# Patient Record
Sex: MALE | Race: WHITE | NOT HISPANIC OR LATINO | Employment: OTHER | URBAN - METROPOLITAN AREA
[De-identification: names, ages, dates, MRNs, and addresses within clinical notes are randomized per-mention and may not be internally consistent; named-entity substitution may affect disease eponyms.]

---

## 2024-01-01 ENCOUNTER — PATIENT OUTREACH (OUTPATIENT)
Dept: ONCOLOGY | Facility: CLINIC | Age: 69
End: 2024-01-01
Payer: COMMERCIAL

## 2024-01-01 ENCOUNTER — PATIENT OUTREACH (OUTPATIENT)
Dept: CARE COORDINATION | Facility: CLINIC | Age: 69
End: 2024-01-01
Payer: COMMERCIAL

## 2024-01-01 ENCOUNTER — DOCUMENTATION ONLY (OUTPATIENT)
Dept: OTHER | Facility: CLINIC | Age: 69
End: 2024-01-01
Payer: COMMERCIAL

## 2024-01-01 ENCOUNTER — HOSPITAL ENCOUNTER (INPATIENT)
Facility: HOSPITAL | Age: 69
LOS: 4 days | Discharge: HOME HEALTH | DRG: 271 | End: 2024-04-09
Attending: EMERGENCY MEDICINE | Admitting: INTERNAL MEDICINE
Payer: COMMERCIAL

## 2024-01-01 ENCOUNTER — APPOINTMENT (OUTPATIENT)
Dept: OCCUPATIONAL THERAPY | Facility: HOSPITAL | Age: 69
DRG: 271 | End: 2024-01-01
Attending: INTERNAL MEDICINE
Payer: COMMERCIAL

## 2024-01-01 ENCOUNTER — LAB (OUTPATIENT)
Dept: INFUSION THERAPY | Facility: HOSPITAL | Age: 69
End: 2024-01-01
Attending: INTERNAL MEDICINE
Payer: COMMERCIAL

## 2024-01-01 ENCOUNTER — HOSPITAL ENCOUNTER (OUTPATIENT)
Dept: MRI IMAGING | Facility: HOSPITAL | Age: 69
Discharge: HOME | End: 2024-03-26
Attending: PHYSICIAN ASSISTANT
Payer: COMMERCIAL

## 2024-01-01 ENCOUNTER — HOME INFUSION (PRE-WILLOW HOME INFUSION) (OUTPATIENT)
Dept: PHARMACY | Facility: CLINIC | Age: 69
End: 2024-01-01
Payer: COMMERCIAL

## 2024-01-01 ENCOUNTER — PATIENT OUTREACH (OUTPATIENT)
Dept: GASTROENTEROLOGY | Facility: CLINIC | Age: 69
End: 2024-01-01
Payer: COMMERCIAL

## 2024-01-01 ENCOUNTER — TELEPHONE (OUTPATIENT)
Dept: INTERVENTIONAL RADIOLOGY/VASCULAR | Facility: CLINIC | Age: 69
End: 2024-01-01
Payer: COMMERCIAL

## 2024-01-01 ENCOUNTER — APPOINTMENT (OUTPATIENT)
Dept: OCCUPATIONAL THERAPY | Facility: HOSPITAL | Age: 69
DRG: 271 | End: 2024-01-01
Payer: COMMERCIAL

## 2024-01-01 ENCOUNTER — ONCOLOGY VISIT (OUTPATIENT)
Dept: ONCOLOGY | Facility: CLINIC | Age: 69
End: 2024-01-01
Attending: INTERNAL MEDICINE
Payer: COMMERCIAL

## 2024-01-01 ENCOUNTER — HOSPITAL ENCOUNTER (OUTPATIENT)
Dept: GENERAL RADIOLOGY | Facility: HOSPITAL | Age: 69
Discharge: HOME | End: 2024-04-11
Attending: PHYSICIAN ASSISTANT | Admitting: PHYSICIAN ASSISTANT
Payer: COMMERCIAL

## 2024-01-01 ENCOUNTER — ANESTHESIA EVENT (OUTPATIENT)
Dept: SURGERY | Facility: CLINIC | Age: 69
End: 2024-01-01
Payer: COMMERCIAL

## 2024-01-01 ENCOUNTER — LAB REQUISITION (OUTPATIENT)
Dept: LAB | Facility: CLINIC | Age: 69
End: 2024-01-01
Payer: COMMERCIAL

## 2024-01-01 ENCOUNTER — HOSPITAL ENCOUNTER (OUTPATIENT)
Facility: CLINIC | Age: 69
End: 2024-01-01
Attending: INTERNAL MEDICINE | Admitting: INTERNAL MEDICINE
Payer: COMMERCIAL

## 2024-01-01 ENCOUNTER — ANESTHESIA (OUTPATIENT)
Dept: SURGERY | Facility: CLINIC | Age: 69
End: 2024-01-01
Payer: COMMERCIAL

## 2024-01-01 ENCOUNTER — APPOINTMENT (OUTPATIENT)
Dept: ULTRASOUND IMAGING | Facility: HOSPITAL | Age: 69
DRG: 271 | End: 2024-01-01
Attending: EMERGENCY MEDICINE
Payer: COMMERCIAL

## 2024-01-01 ENCOUNTER — VIRTUAL VISIT (OUTPATIENT)
Dept: ONCOLOGY | Facility: CLINIC | Age: 69
End: 2024-01-01
Payer: COMMERCIAL

## 2024-01-01 ENCOUNTER — TELEPHONE (OUTPATIENT)
Dept: GASTROENTEROLOGY | Facility: CLINIC | Age: 69
End: 2024-01-01
Payer: COMMERCIAL

## 2024-01-01 ENCOUNTER — TRANSCRIBE ORDERS (OUTPATIENT)
Dept: OTHER | Age: 69
End: 2024-01-01

## 2024-01-01 ENCOUNTER — NURSE TRIAGE (OUTPATIENT)
Dept: ONCOLOGY | Facility: CLINIC | Age: 69
End: 2024-01-01

## 2024-01-01 ENCOUNTER — APPOINTMENT (OUTPATIENT)
Dept: CT IMAGING | Facility: HOSPITAL | Age: 69
DRG: 271 | End: 2024-01-01
Attending: INTERNAL MEDICINE
Payer: COMMERCIAL

## 2024-01-01 ENCOUNTER — NURSE TRIAGE (OUTPATIENT)
Dept: ONCOLOGY | Facility: CLINIC | Age: 69
End: 2024-01-01
Payer: COMMERCIAL

## 2024-01-01 ENCOUNTER — VIRTUAL VISIT (OUTPATIENT)
Dept: PALLIATIVE CARE | Facility: CLINIC | Age: 69
End: 2024-01-01
Attending: INTERNAL MEDICINE
Payer: COMMERCIAL

## 2024-01-01 ENCOUNTER — APPOINTMENT (OUTPATIENT)
Dept: INTERVENTIONAL RADIOLOGY/VASCULAR | Facility: HOSPITAL | Age: 69
DRG: 271 | End: 2024-01-01
Attending: RADIOLOGY
Payer: COMMERCIAL

## 2024-01-01 ENCOUNTER — HOSPITAL ENCOUNTER (OUTPATIENT)
Dept: ULTRASOUND IMAGING | Facility: HOSPITAL | Age: 69
Discharge: HOME | End: 2024-03-28
Attending: PHYSICIAN ASSISTANT | Admitting: PHYSICIAN ASSISTANT
Payer: COMMERCIAL

## 2024-01-01 ENCOUNTER — APPOINTMENT (OUTPATIENT)
Dept: LAB | Facility: CLINIC | Age: 69
End: 2024-01-01
Attending: INTERNAL MEDICINE
Payer: COMMERCIAL

## 2024-01-01 ENCOUNTER — HOSPITAL ENCOUNTER (OUTPATIENT)
Dept: CT IMAGING | Facility: HOSPITAL | Age: 69
Discharge: HOME | End: 2024-03-23
Attending: INTERNAL MEDICINE | Admitting: INTERNAL MEDICINE
Payer: COMMERCIAL

## 2024-01-01 ENCOUNTER — APPOINTMENT (OUTPATIENT)
Dept: RADIOLOGY | Facility: HOSPITAL | Age: 69
DRG: 271 | End: 2024-01-01
Attending: EMERGENCY MEDICINE
Payer: COMMERCIAL

## 2024-01-01 ENCOUNTER — HOSPITAL ENCOUNTER (OUTPATIENT)
Dept: INTERVENTIONAL RADIOLOGY/VASCULAR | Facility: HOSPITAL | Age: 69
Discharge: HOME | End: 2024-03-13
Attending: INTERNAL MEDICINE | Admitting: RADIOLOGY
Payer: COMMERCIAL

## 2024-01-01 ENCOUNTER — APPOINTMENT (OUTPATIENT)
Dept: PHYSICAL THERAPY | Facility: HOSPITAL | Age: 69
DRG: 271 | End: 2024-01-01
Attending: INTERNAL MEDICINE
Payer: COMMERCIAL

## 2024-01-01 ENCOUNTER — PRE VISIT (OUTPATIENT)
Dept: ONCOLOGY | Facility: CLINIC | Age: 69
End: 2024-01-01
Payer: COMMERCIAL

## 2024-01-01 ENCOUNTER — HOSPITAL ENCOUNTER (OUTPATIENT)
Facility: CLINIC | Age: 69
Discharge: HOME | End: 2024-04-02
Attending: INTERNAL MEDICINE | Admitting: INTERNAL MEDICINE
Payer: COMMERCIAL

## 2024-01-01 ENCOUNTER — TELEPHONE (OUTPATIENT)
Dept: ONCOLOGY | Facility: CLINIC | Age: 69
End: 2024-01-01

## 2024-01-01 ENCOUNTER — PREP FOR PROCEDURE (OUTPATIENT)
Dept: GASTROENTEROLOGY | Facility: CLINIC | Age: 69
End: 2024-01-01

## 2024-01-01 ENCOUNTER — APPOINTMENT (OUTPATIENT)
Dept: GENERAL RADIOLOGY | Facility: CLINIC | Age: 69
End: 2024-01-01
Attending: INTERNAL MEDICINE
Payer: COMMERCIAL

## 2024-01-01 ENCOUNTER — PREP FOR PROCEDURE (OUTPATIENT)
Dept: GASTROENTEROLOGY | Facility: CLINIC | Age: 69
End: 2024-01-01
Payer: COMMERCIAL

## 2024-01-01 VITALS
RESPIRATION RATE: 18 BRPM | HEART RATE: 98 BPM | WEIGHT: 181 LBS | BODY MASS INDEX: 26.81 KG/M2 | TEMPERATURE: 97.6 F | DIASTOLIC BLOOD PRESSURE: 66 MMHG | OXYGEN SATURATION: 95 % | SYSTOLIC BLOOD PRESSURE: 102 MMHG | HEIGHT: 69 IN

## 2024-01-01 VITALS — WEIGHT: 175 LBS | BODY MASS INDEX: 25.92 KG/M2 | HEIGHT: 69 IN

## 2024-01-01 VITALS — HEIGHT: 68 IN | BODY MASS INDEX: 26.07 KG/M2 | WEIGHT: 172 LBS

## 2024-01-01 VITALS
OXYGEN SATURATION: 99 % | DIASTOLIC BLOOD PRESSURE: 68 MMHG | SYSTOLIC BLOOD PRESSURE: 116 MMHG | HEART RATE: 74 BPM | RESPIRATION RATE: 20 BRPM | TEMPERATURE: 98 F

## 2024-01-01 VITALS
RESPIRATION RATE: 16 BRPM | HEART RATE: 110 BPM | SYSTOLIC BLOOD PRESSURE: 106 MMHG | DIASTOLIC BLOOD PRESSURE: 73 MMHG | TEMPERATURE: 97.4 F | WEIGHT: 191.6 LBS | BODY MASS INDEX: 28.29 KG/M2

## 2024-01-01 VITALS — BODY MASS INDEX: 24.56 KG/M2 | WEIGHT: 165.8 LBS | HEIGHT: 69 IN

## 2024-01-01 VITALS
WEIGHT: 181.22 LBS | SYSTOLIC BLOOD PRESSURE: 107 MMHG | DIASTOLIC BLOOD PRESSURE: 83 MMHG | RESPIRATION RATE: 16 BRPM | TEMPERATURE: 97.1 F | HEART RATE: 73 BPM | OXYGEN SATURATION: 94 % | HEIGHT: 69 IN | BODY MASS INDEX: 26.84 KG/M2

## 2024-01-01 DIAGNOSIS — E80.6 HYPERBILIRUBINEMIA: ICD-10-CM

## 2024-01-01 DIAGNOSIS — C25.8 OVERLAPPING MALIGNANT NEOPLASM OF PANCREAS (H): Primary | ICD-10-CM

## 2024-01-01 DIAGNOSIS — C80.1 MALIGNANT BILIARY OBSTRUCTION (H): Primary | ICD-10-CM

## 2024-01-01 DIAGNOSIS — K82.8 THICKENING OF WALL OF GALLBLADDER: ICD-10-CM

## 2024-01-01 DIAGNOSIS — K83.1 MALIGNANT BILIARY OBSTRUCTION (H): Primary | ICD-10-CM

## 2024-01-01 DIAGNOSIS — J18.9 PNEUMONIA OF BOTH LOWER LOBES DUE TO INFECTIOUS ORGANISM: Primary | ICD-10-CM

## 2024-01-01 DIAGNOSIS — R10.11 RUQ ABDOMINAL PAIN: Primary | ICD-10-CM

## 2024-01-01 DIAGNOSIS — R60.1 ANASARCA: ICD-10-CM

## 2024-01-01 DIAGNOSIS — C25.9 PANCREATIC CANCER (H): Primary | ICD-10-CM

## 2024-01-01 DIAGNOSIS — G89.3 CANCER ASSOCIATED PAIN: ICD-10-CM

## 2024-01-01 DIAGNOSIS — Z51.5 PALLIATIVE CARE PATIENT: Primary | ICD-10-CM

## 2024-01-01 DIAGNOSIS — C25.8 OVERLAPPING MALIGNANT NEOPLASM OF PANCREAS (H): ICD-10-CM

## 2024-01-01 DIAGNOSIS — C79.9 METASTASIS FROM PANCREATIC CANCER (H): ICD-10-CM

## 2024-01-01 DIAGNOSIS — R10.11 RUQ ABDOMINAL PAIN: ICD-10-CM

## 2024-01-01 DIAGNOSIS — D72.828 OTHER ELEVATED WHITE BLOOD CELL (WBC) COUNT: ICD-10-CM

## 2024-01-01 DIAGNOSIS — E44.0 MODERATE PROTEIN-CALORIE MALNUTRITION (H): ICD-10-CM

## 2024-01-01 DIAGNOSIS — R74.01 TRANSAMINITIS: ICD-10-CM

## 2024-01-01 DIAGNOSIS — I82.409 DVT (DEEP VENOUS THROMBOSIS) (H): Primary | ICD-10-CM

## 2024-01-01 DIAGNOSIS — C25.9 METASTASIS FROM PANCREATIC CANCER (H): ICD-10-CM

## 2024-01-01 DIAGNOSIS — G47.01 INSOMNIA DUE TO MEDICAL CONDITION: ICD-10-CM

## 2024-01-01 DIAGNOSIS — K81.9 CHOLECYSTITIS: ICD-10-CM

## 2024-01-01 DIAGNOSIS — I82.413 ACUTE DEEP VEIN THROMBOSIS (DVT) OF FEMORAL VEIN OF BOTH LOWER EXTREMITIES (H): ICD-10-CM

## 2024-01-01 LAB
ABO/RH(D): NORMAL
ALBUMIN SERPL BCG-MCNC: 2 G/DL (ref 3.5–5.2)
ALBUMIN SERPL BCG-MCNC: 2.2 G/DL (ref 3.5–5.2)
ALBUMIN SERPL BCG-MCNC: 2.3 G/DL (ref 3.5–5.2)
ALBUMIN SERPL BCG-MCNC: 2.4 G/DL (ref 3.5–5.2)
ALBUMIN SERPL BCG-MCNC: 2.6 G/DL (ref 3.5–5.2)
ALBUMIN UR-MCNC: 10 MG/DL
ALP SERPL-CCNC: 1299 U/L (ref 40–150)
ALP SERPL-CCNC: 1552 U/L (ref 40–150)
ALP SERPL-CCNC: 1553 U/L (ref 40–150)
ALP SERPL-CCNC: 1672 U/L (ref 40–150)
ALP SERPL-CCNC: 1779 U/L (ref 40–150)
ALP SERPL-CCNC: 1876 U/L (ref 40–150)
ALP SERPL-CCNC: 1946 U/L (ref 40–150)
ALT SERPL W P-5'-P-CCNC: 113 U/L (ref 0–70)
ALT SERPL W P-5'-P-CCNC: 56 U/L (ref 0–70)
ALT SERPL W P-5'-P-CCNC: 58 U/L (ref 0–70)
ALT SERPL W P-5'-P-CCNC: 60 U/L (ref 0–70)
ALT SERPL W P-5'-P-CCNC: 70 U/L (ref 0–70)
ALT SERPL W P-5'-P-CCNC: 80 U/L (ref 0–70)
ALT SERPL W P-5'-P-CCNC: 91 U/L (ref 0–70)
ANION GAP SERPL CALCULATED.3IONS-SCNC: 11 MMOL/L (ref 7–15)
ANION GAP SERPL CALCULATED.3IONS-SCNC: 13 MMOL/L (ref 7–15)
ANION GAP SERPL CALCULATED.3IONS-SCNC: 13 MMOL/L (ref 7–15)
ANION GAP SERPL CALCULATED.3IONS-SCNC: 8 MMOL/L (ref 7–15)
ANION GAP SERPL CALCULATED.3IONS-SCNC: 9 MMOL/L (ref 7–15)
ANTIBODY SCREEN: NEGATIVE
APPEARANCE UR: ABNORMAL
AST SERPL W P-5'-P-CCNC: 105 U/L (ref 0–45)
AST SERPL W P-5'-P-CCNC: 106 U/L (ref 0–45)
AST SERPL W P-5'-P-CCNC: 125 U/L (ref 0–45)
AST SERPL W P-5'-P-CCNC: 140 U/L (ref 0–45)
AST SERPL W P-5'-P-CCNC: 144 U/L (ref 0–45)
AST SERPL W P-5'-P-CCNC: 152 U/L (ref 0–45)
AST SERPL W P-5'-P-CCNC: 84 U/L (ref 0–45)
ATRIAL RATE - MUSE: 96 BPM
BACTERIA BLD CULT: NO GROWTH
BACTERIA BLD CULT: NO GROWTH
BASOPHILS # BLD AUTO: 0.1 10E3/UL (ref 0–0.2)
BASOPHILS # BLD AUTO: 0.2 10E3/UL (ref 0–0.2)
BASOPHILS # BLD AUTO: ABNORMAL 10*3/UL
BASOPHILS # BLD MANUAL: 0 10E3/UL (ref 0–0.2)
BASOPHILS NFR BLD AUTO: 0 %
BASOPHILS NFR BLD AUTO: 0 %
BASOPHILS NFR BLD AUTO: ABNORMAL %
BASOPHILS NFR BLD MANUAL: 0 %
BILIRUB DIRECT SERPL-MCNC: 12.68 MG/DL (ref 0–0.3)
BILIRUB DIRECT SERPL-MCNC: 13.04 MG/DL (ref 0–0.3)
BILIRUB SERPL-MCNC: 11.2 MG/DL
BILIRUB SERPL-MCNC: 13.2 MG/DL
BILIRUB SERPL-MCNC: 13.6 MG/DL
BILIRUB SERPL-MCNC: 14.7 MG/DL
BILIRUB SERPL-MCNC: 14.9 MG/DL
BILIRUB SERPL-MCNC: 15 MG/DL
BILIRUB SERPL-MCNC: 16.8 MG/DL
BILIRUB SERPL-MCNC: 6.2 MG/DL
BILIRUB UR QL STRIP: ABNORMAL
BUN SERPL-MCNC: 21.9 MG/DL (ref 8–23)
BUN SERPL-MCNC: 24.6 MG/DL (ref 8–23)
BUN SERPL-MCNC: 33 MG/DL (ref 8–23)
BUN SERPL-MCNC: 37.7 MG/DL (ref 8–23)
BUN SERPL-MCNC: 38.2 MG/DL (ref 8–23)
BURR CELLS BLD QL SMEAR: SLIGHT
CALCIUM SERPL-MCNC: 8.8 MG/DL (ref 8.8–10.2)
CALCIUM SERPL-MCNC: 9.3 MG/DL (ref 8.8–10.2)
CALCIUM SERPL-MCNC: 9.4 MG/DL (ref 8.8–10.2)
CALCIUM SERPL-MCNC: 9.7 MG/DL (ref 8.8–10.2)
CALCIUM SERPL-MCNC: 9.8 MG/DL (ref 8.8–10.2)
CHLORIDE SERPL-SCNC: 100 MMOL/L (ref 98–107)
CHLORIDE SERPL-SCNC: 96 MMOL/L (ref 98–107)
CHLORIDE SERPL-SCNC: 96 MMOL/L (ref 98–107)
CHLORIDE SERPL-SCNC: 97 MMOL/L (ref 98–107)
CHLORIDE SERPL-SCNC: 99 MMOL/L (ref 98–107)
COLOR UR AUTO: ABNORMAL
CREAT SERPL-MCNC: 0.58 MG/DL (ref 0.67–1.17)
CREAT SERPL-MCNC: 0.66 MG/DL (ref 0.67–1.17)
CREAT SERPL-MCNC: 0.7 MG/DL (ref 0.67–1.17)
CREAT SERPL-MCNC: 0.77 MG/DL (ref 0.67–1.17)
CREAT SERPL-MCNC: 0.8 MG/DL (ref 0.67–1.17)
DEPRECATED HCO3 PLAS-SCNC: 18 MMOL/L (ref 22–29)
DEPRECATED HCO3 PLAS-SCNC: 21 MMOL/L (ref 22–29)
DEPRECATED HCO3 PLAS-SCNC: 21 MMOL/L (ref 22–29)
DEPRECATED HCO3 PLAS-SCNC: 22 MMOL/L (ref 22–29)
DEPRECATED HCO3 PLAS-SCNC: 24 MMOL/L (ref 22–29)
DIASTOLIC BLOOD PRESSURE - MUSE: NORMAL MMHG
EGFRCR SERPLBLD CKD-EPI 2021: >90 ML/MIN/1.73M2
EOSINOPHIL # BLD AUTO: 0.1 10E3/UL (ref 0–0.7)
EOSINOPHIL # BLD AUTO: 0.1 10E3/UL (ref 0–0.7)
EOSINOPHIL # BLD AUTO: ABNORMAL 10*3/UL
EOSINOPHIL # BLD MANUAL: 0 10E3/UL (ref 0–0.7)
EOSINOPHIL NFR BLD AUTO: 0 %
EOSINOPHIL NFR BLD AUTO: 1 %
EOSINOPHIL NFR BLD AUTO: ABNORMAL %
EOSINOPHIL NFR BLD MANUAL: 0 %
ERCP: NORMAL
ERYTHROCYTE [DISTWIDTH] IN BLOOD BY AUTOMATED COUNT: 19.5 % (ref 10–15)
ERYTHROCYTE [DISTWIDTH] IN BLOOD BY AUTOMATED COUNT: 21.4 % (ref 10–15)
ERYTHROCYTE [DISTWIDTH] IN BLOOD BY AUTOMATED COUNT: 22.4 % (ref 10–15)
ERYTHROCYTE [DISTWIDTH] IN BLOOD BY AUTOMATED COUNT: 22.5 % (ref 10–15)
ERYTHROCYTE [DISTWIDTH] IN BLOOD BY AUTOMATED COUNT: 22.5 % (ref 10–15)
ERYTHROCYTE [DISTWIDTH] IN BLOOD BY AUTOMATED COUNT: 22.6 % (ref 10–15)
ERYTHROCYTE [DISTWIDTH] IN BLOOD BY AUTOMATED COUNT: 22.6 % (ref 10–15)
ERYTHROCYTE [DISTWIDTH] IN BLOOD BY AUTOMATED COUNT: 22.9 % (ref 10–15)
GLUCOSE BLDC GLUCOMTR-MCNC: 120 MG/DL (ref 70–99)
GLUCOSE BLDC GLUCOMTR-MCNC: 132 MG/DL (ref 70–99)
GLUCOSE BLDC GLUCOMTR-MCNC: 132 MG/DL (ref 70–99)
GLUCOSE BLDC GLUCOMTR-MCNC: 139 MG/DL (ref 70–99)
GLUCOSE BLDC GLUCOMTR-MCNC: 156 MG/DL (ref 70–99)
GLUCOSE BLDC GLUCOMTR-MCNC: 158 MG/DL (ref 70–99)
GLUCOSE BLDC GLUCOMTR-MCNC: 164 MG/DL (ref 70–99)
GLUCOSE BLDC GLUCOMTR-MCNC: 171 MG/DL (ref 70–99)
GLUCOSE BLDC GLUCOMTR-MCNC: 172 MG/DL (ref 70–99)
GLUCOSE BLDC GLUCOMTR-MCNC: 188 MG/DL (ref 70–99)
GLUCOSE BLDC GLUCOMTR-MCNC: 190 MG/DL (ref 70–99)
GLUCOSE BLDC GLUCOMTR-MCNC: 227 MG/DL (ref 70–99)
GLUCOSE SERPL-MCNC: 128 MG/DL (ref 70–99)
GLUCOSE SERPL-MCNC: 179 MG/DL (ref 70–99)
GLUCOSE SERPL-MCNC: 185 MG/DL (ref 70–99)
GLUCOSE SERPL-MCNC: 217 MG/DL (ref 70–99)
GLUCOSE SERPL-MCNC: 246 MG/DL (ref 70–99)
GLUCOSE UR STRIP-MCNC: NEGATIVE MG/DL
HBA1C MFR BLD: 5.1 %
HCT VFR BLD AUTO: 25.5 % (ref 40–53)
HCT VFR BLD AUTO: 27 % (ref 40–53)
HCT VFR BLD AUTO: 27.2 % (ref 40–53)
HCT VFR BLD AUTO: 28 % (ref 40–53)
HCT VFR BLD AUTO: 28.6 % (ref 40–53)
HCT VFR BLD AUTO: 29.3 % (ref 40–53)
HCT VFR BLD AUTO: 31.7 % (ref 40–53)
HCT VFR BLD AUTO: 33.2 % (ref 40–53)
HGB BLD-MCNC: 10.2 G/DL (ref 13.3–17.7)
HGB BLD-MCNC: 10.6 G/DL (ref 13.3–17.7)
HGB BLD-MCNC: 8.2 G/DL (ref 13.3–17.7)
HGB BLD-MCNC: 8.5 G/DL (ref 13.3–17.7)
HGB BLD-MCNC: 8.6 G/DL (ref 13.3–17.7)
HGB BLD-MCNC: 9.1 G/DL (ref 13.3–17.7)
HGB BLD-MCNC: 9.2 G/DL (ref 13.3–17.7)
HGB BLD-MCNC: 9.5 G/DL (ref 13.3–17.7)
HGB UR QL STRIP: NEGATIVE
HOLD SPECIMEN: NORMAL
IMM GRANULOCYTES # BLD: 0.3 10E3/UL
IMM GRANULOCYTES # BLD: 1.7 10E3/UL
IMM GRANULOCYTES # BLD: ABNORMAL 10*3/UL
IMM GRANULOCYTES NFR BLD: 2 %
IMM GRANULOCYTES NFR BLD: 4 %
IMM GRANULOCYTES NFR BLD: ABNORMAL %
INR PPP: 2.78 (ref 0.85–1.15)
INR PPP: 2.93 (ref 0.85–1.15)
INTERPRETATION ECG - MUSE: NORMAL
KETONES UR STRIP-MCNC: NEGATIVE MG/DL
LACTATE SERPL-SCNC: 1.2 MMOL/L (ref 0.7–2)
LACTATE SERPL-SCNC: 1.5 MMOL/L (ref 0.7–2)
LACTATE SERPL-SCNC: 1.8 MMOL/L (ref 0.7–2)
LACTATE SERPL-SCNC: 2.5 MMOL/L (ref 0.7–2)
LACTATE SERPL-SCNC: 2.6 MMOL/L (ref 0.7–2)
LEUKOCYTE ESTERASE UR QL STRIP: NEGATIVE
LIPASE SERPL-CCNC: 19 U/L (ref 13–60)
LIPASE SERPL-CCNC: 37 U/L (ref 13–60)
LYMPHOCYTES # BLD AUTO: 0.8 10E3/UL (ref 0.8–5.3)
LYMPHOCYTES # BLD AUTO: 1.2 10E3/UL (ref 0.8–5.3)
LYMPHOCYTES # BLD AUTO: ABNORMAL 10*3/UL
LYMPHOCYTES # BLD MANUAL: 0.4 10E3/UL (ref 0.8–5.3)
LYMPHOCYTES NFR BLD AUTO: 3 %
LYMPHOCYTES NFR BLD AUTO: 4 %
LYMPHOCYTES NFR BLD AUTO: ABNORMAL %
LYMPHOCYTES NFR BLD MANUAL: 1 %
MAGNESIUM SERPL-MCNC: 2.1 MG/DL (ref 1.7–2.3)
MCH RBC QN AUTO: 30.5 PG (ref 26.5–33)
MCH RBC QN AUTO: 30.7 PG (ref 26.5–33)
MCH RBC QN AUTO: 30.8 PG (ref 26.5–33)
MCH RBC QN AUTO: 31.1 PG (ref 26.5–33)
MCH RBC QN AUTO: 31.2 PG (ref 26.5–33)
MCH RBC QN AUTO: 31.3 PG (ref 26.5–33)
MCH RBC QN AUTO: 31.3 PG (ref 26.5–33)
MCH RBC QN AUTO: 31.4 PG (ref 26.5–33)
MCHC RBC AUTO-ENTMCNC: 31.5 G/DL (ref 31.5–36.5)
MCHC RBC AUTO-ENTMCNC: 31.6 G/DL (ref 31.5–36.5)
MCHC RBC AUTO-ENTMCNC: 31.8 G/DL (ref 31.5–36.5)
MCHC RBC AUTO-ENTMCNC: 31.9 G/DL (ref 31.5–36.5)
MCHC RBC AUTO-ENTMCNC: 32.2 G/DL (ref 31.5–36.5)
MCHC RBC AUTO-ENTMCNC: 32.2 G/DL (ref 31.5–36.5)
MCHC RBC AUTO-ENTMCNC: 32.4 G/DL (ref 31.5–36.5)
MCHC RBC AUTO-ENTMCNC: 32.9 G/DL (ref 31.5–36.5)
MCV RBC AUTO: 95 FL (ref 78–100)
MCV RBC AUTO: 96 FL (ref 78–100)
MCV RBC AUTO: 96 FL (ref 78–100)
MCV RBC AUTO: 97 FL (ref 78–100)
MCV RBC AUTO: 97 FL (ref 78–100)
MCV RBC AUTO: 98 FL (ref 78–100)
MONOCYTES # BLD AUTO: 1.3 10E3/UL (ref 0–1.3)
MONOCYTES # BLD AUTO: 1.6 10E3/UL (ref 0–1.3)
MONOCYTES # BLD AUTO: ABNORMAL 10*3/UL
MONOCYTES # BLD MANUAL: 0.4 10E3/UL (ref 0–1.3)
MONOCYTES NFR BLD AUTO: 4 %
MONOCYTES NFR BLD AUTO: 6 %
MONOCYTES NFR BLD AUTO: ABNORMAL %
MONOCYTES NFR BLD MANUAL: 1 %
MUCOUS THREADS #/AREA URNS LPF: PRESENT /LPF
NEUTROPHILS # BLD AUTO: 18.6 10E3/UL (ref 1.6–8.3)
NEUTROPHILS # BLD AUTO: 35.5 10E3/UL (ref 1.6–8.3)
NEUTROPHILS # BLD AUTO: ABNORMAL 10*3/UL
NEUTROPHILS # BLD MANUAL: 39.8 10E3/UL (ref 1.6–8.3)
NEUTROPHILS NFR BLD AUTO: 87 %
NEUTROPHILS NFR BLD AUTO: 89 %
NEUTROPHILS NFR BLD AUTO: ABNORMAL %
NEUTROPHILS NFR BLD MANUAL: 98 %
NITRATE UR QL: NEGATIVE
NRBC # BLD AUTO: 0 10E3/UL
NRBC BLD AUTO-RTO: 0 /100
NT-PROBNP SERPL-MCNC: 391 PG/ML (ref 0–900)
P AXIS - MUSE: 36 DEGREES
PATH REPORT.COMMENTS IMP SPEC: ABNORMAL
PATH REPORT.COMMENTS IMP SPEC: ABNORMAL
PATH REPORT.COMMENTS IMP SPEC: YES
PATH REPORT.FINAL DX SPEC: ABNORMAL
PATH REPORT.GROSS SPEC: ABNORMAL
PATH REPORT.MICROSCOPIC SPEC OTHER STN: ABNORMAL
PATH REPORT.RELEVANT HX SPEC: ABNORMAL
PATH REPORT.RELEVANT HX SPEC: ABNORMAL
PATH REPORT.SITE OF ORIGIN SPEC: ABNORMAL
PH UR STRIP: 6 [PH] (ref 5–7)
PLAT MORPH BLD: ABNORMAL
PLATELET # BLD AUTO: 101 10E3/UL (ref 150–450)
PLATELET # BLD AUTO: 109 10E3/UL (ref 150–450)
PLATELET # BLD AUTO: 111 10E3/UL (ref 150–450)
PLATELET # BLD AUTO: 133 10E3/UL (ref 150–450)
PLATELET # BLD AUTO: 148 10E3/UL (ref 150–450)
PLATELET # BLD AUTO: 176 10E3/UL (ref 150–450)
PLATELET # BLD AUTO: 93 10E3/UL (ref 150–450)
PLATELET # BLD AUTO: 97 10E3/UL (ref 150–450)
POLYCHROMASIA BLD QL SMEAR: SLIGHT
POTASSIUM SERPL-SCNC: 3.5 MMOL/L (ref 3.4–5.3)
POTASSIUM SERPL-SCNC: 3.7 MMOL/L (ref 3.4–5.3)
POTASSIUM SERPL-SCNC: 4 MMOL/L (ref 3.4–5.3)
POTASSIUM SERPL-SCNC: 4.3 MMOL/L (ref 3.4–5.3)
POTASSIUM SERPL-SCNC: 4.4 MMOL/L (ref 3.4–5.3)
PR INTERVAL - MUSE: 172 MS
PROCALCITONIN SERPL IA-MCNC: 0.69 NG/ML
PROCALCITONIN SERPL IA-MCNC: 0.73 NG/ML
PROCALCITONIN SERPL IA-MCNC: 0.93 NG/ML
PROT SERPL-MCNC: 4.4 G/DL (ref 6.4–8.3)
PROT SERPL-MCNC: 4.5 G/DL (ref 6.4–8.3)
PROT SERPL-MCNC: 4.6 G/DL (ref 6.4–8.3)
PROT SERPL-MCNC: 4.8 G/DL (ref 6.4–8.3)
PROT SERPL-MCNC: 5 G/DL (ref 6.4–8.3)
PROT SERPL-MCNC: 5.2 G/DL (ref 6.4–8.3)
PROT SERPL-MCNC: 5.7 G/DL (ref 6.4–8.3)
QRS DURATION - MUSE: 102 MS
QT - MUSE: 344 MS
QTC - MUSE: 434 MS
R AXIS - MUSE: -29 DEGREES
RADIOLOGIST FLAGS: ABNORMAL
RBC # BLD AUTO: 2.61 10E6/UL (ref 4.4–5.9)
RBC # BLD AUTO: 2.76 10E6/UL (ref 4.4–5.9)
RBC # BLD AUTO: 2.82 10E6/UL (ref 4.4–5.9)
RBC # BLD AUTO: 2.92 10E6/UL (ref 4.4–5.9)
RBC # BLD AUTO: 2.94 10E6/UL (ref 4.4–5.9)
RBC # BLD AUTO: 3.04 10E6/UL (ref 4.4–5.9)
RBC # BLD AUTO: 3.32 10E6/UL (ref 4.4–5.9)
RBC # BLD AUTO: 3.41 10E6/UL (ref 4.4–5.9)
RBC MORPH BLD: ABNORMAL
RBC URINE: 2 /HPF
SODIUM SERPL-SCNC: 128 MMOL/L (ref 135–145)
SODIUM SERPL-SCNC: 129 MMOL/L (ref 135–145)
SODIUM SERPL-SCNC: 130 MMOL/L (ref 135–145)
SODIUM SERPL-SCNC: 130 MMOL/L (ref 135–145)
SODIUM SERPL-SCNC: 131 MMOL/L (ref 135–145)
SP GR UR STRIP: 1.02 (ref 1–1.03)
SPECIMEN EXPIRATION DATE: NORMAL
SQUAMOUS EPITHELIAL: 4 /HPF
SYSTOLIC BLOOD PRESSURE - MUSE: NORMAL MMHG
T AXIS - MUSE: 57 DEGREES
TROPONIN T SERPL HS-MCNC: 25 NG/L
UFH PPP CHRO-ACNC: 0.12 IU/ML
UFH PPP CHRO-ACNC: 0.21 IU/ML
UFH PPP CHRO-ACNC: 0.42 IU/ML
UFH PPP CHRO-ACNC: 1.07 IU/ML
UFH PPP CHRO-ACNC: <0.1 IU/ML
UROBILINOGEN UR STRIP-MCNC: NORMAL MG/DL
VENTRICULAR RATE- MUSE: 96 BPM
WBC # BLD AUTO: 21.2 10E3/UL (ref 4–11)
WBC # BLD AUTO: 28.6 10E3/UL (ref 4–11)
WBC # BLD AUTO: 32.9 10E3/UL (ref 4–11)
WBC # BLD AUTO: 34.9 10E3/UL (ref 4–11)
WBC # BLD AUTO: 40 10E3/UL (ref 4–11)
WBC # BLD AUTO: 40.2 10E3/UL (ref 4–11)
WBC # BLD AUTO: 40.6 10E3/UL (ref 4–11)
WBC # BLD AUTO: 43 10E3/UL (ref 4–11)
WBC URINE: 0 /HPF

## 2024-01-01 PROCEDURE — 258N000003 HC RX IP 258 OP 636: Performed by: INTERNAL MEDICINE

## 2024-01-01 PROCEDURE — 97110 THERAPEUTIC EXERCISES: CPT | Mod: GO

## 2024-01-01 PROCEDURE — 99205 OFFICE O/P NEW HI 60 MIN: CPT | Mod: 95 | Performed by: FAMILY MEDICINE

## 2024-01-01 PROCEDURE — 97535 SELF CARE MNGMENT TRAINING: CPT | Mod: GO

## 2024-01-01 PROCEDURE — 83880 ASSAY OF NATRIURETIC PEPTIDE: CPT | Performed by: EMERGENCY MEDICINE

## 2024-01-01 PROCEDURE — 43274 ERCP DUCT STENT PLACEMENT: CPT | Performed by: ANESTHESIOLOGY

## 2024-01-01 PROCEDURE — 250N000009 HC RX 250: Performed by: INTERNAL MEDICINE

## 2024-01-01 PROCEDURE — 255N000002 HC RX 255 OP 636: Performed by: PHYSICIAN ASSISTANT

## 2024-01-01 PROCEDURE — 250N000011 HC RX IP 250 OP 636: Performed by: INTERNAL MEDICINE

## 2024-01-01 PROCEDURE — 85027 COMPLETE CBC AUTOMATED: CPT | Performed by: INTERNAL MEDICINE

## 2024-01-01 PROCEDURE — 75825 VEIN X-RAY TRUNK: CPT

## 2024-01-01 PROCEDURE — 85520 HEPARIN ASSAY: CPT | Performed by: EMERGENCY MEDICINE

## 2024-01-01 PROCEDURE — 99205 OFFICE O/P NEW HI 60 MIN: CPT | Mod: 95 | Performed by: INTERNAL MEDICINE

## 2024-01-01 PROCEDURE — C1788 PORT, INDWELLING, IMP: HCPCS

## 2024-01-01 PROCEDURE — 74183 MRI ABD W/O CNTR FLWD CNTR: CPT

## 2024-01-01 PROCEDURE — 83605 ASSAY OF LACTIC ACID: CPT | Performed by: INTERNAL MEDICINE

## 2024-01-01 PROCEDURE — 99152 MOD SED SAME PHYS/QHP 5/>YRS: CPT

## 2024-01-01 PROCEDURE — 93005 ELECTROCARDIOGRAM TRACING: CPT | Performed by: EMERGENCY MEDICINE

## 2024-01-01 PROCEDURE — 85025 COMPLETE CBC W/AUTO DIFF WBC: CPT | Performed by: INTERNAL MEDICINE

## 2024-01-01 PROCEDURE — 250N000011 HC RX IP 250 OP 636: Performed by: EMERGENCY MEDICINE

## 2024-01-01 PROCEDURE — 36010 PLACE CATHETER IN VEIN: CPT

## 2024-01-01 PROCEDURE — 83735 ASSAY OF MAGNESIUM: CPT | Performed by: EMERGENCY MEDICINE

## 2024-01-01 PROCEDURE — 83690 ASSAY OF LIPASE: CPT | Performed by: EMERGENCY MEDICINE

## 2024-01-01 PROCEDURE — 99417 PROLNG OP E/M EACH 15 MIN: CPT | Performed by: PHYSICIAN ASSISTANT

## 2024-01-01 PROCEDURE — 06CN3ZZ EXTIRPATION OF MATTER FROM LEFT FEMORAL VEIN, PERCUTANEOUS APPROACH: ICD-10-PCS | Performed by: RADIOLOGY

## 2024-01-01 PROCEDURE — 83605 ASSAY OF LACTIC ACID: CPT | Performed by: EMERGENCY MEDICINE

## 2024-01-01 PROCEDURE — 36415 COLL VENOUS BLD VENIPUNCTURE: CPT | Performed by: PHYSICIAN ASSISTANT

## 2024-01-01 PROCEDURE — 710N000010 HC RECOVERY PHASE 1, LEVEL 2, PER MIN: Performed by: INTERNAL MEDICINE

## 2024-01-01 PROCEDURE — 99215 OFFICE O/P EST HI 40 MIN: CPT | Mod: 95 | Performed by: PHYSICIAN ASSISTANT

## 2024-01-01 PROCEDURE — 85610 PROTHROMBIN TIME: CPT | Performed by: INTERNAL MEDICINE

## 2024-01-01 PROCEDURE — 85025 COMPLETE CBC W/AUTO DIFF WBC: CPT | Performed by: EMERGENCY MEDICINE

## 2024-01-01 PROCEDURE — 80053 COMPREHEN METABOLIC PANEL: CPT | Performed by: EMERGENCY MEDICINE

## 2024-01-01 PROCEDURE — 250N000009 HC RX 250: Performed by: RADIOLOGY

## 2024-01-01 PROCEDURE — 99223 1ST HOSP IP/OBS HIGH 75: CPT | Performed by: INTERNAL MEDICINE

## 2024-01-01 PROCEDURE — 84145 PROCALCITONIN (PCT): CPT | Performed by: INTERNAL MEDICINE

## 2024-01-01 PROCEDURE — 99232 SBSQ HOSP IP/OBS MODERATE 35: CPT | Performed by: INTERNAL MEDICINE

## 2024-01-01 PROCEDURE — 85610 PROTHROMBIN TIME: CPT | Performed by: EMERGENCY MEDICINE

## 2024-01-01 PROCEDURE — 250N000011 HC RX IP 250 OP 636: Performed by: RADIOLOGY

## 2024-01-01 PROCEDURE — 250N000011 HC RX IP 250 OP 636: Performed by: PHYSICIAN ASSISTANT

## 2024-01-01 PROCEDURE — A9585 GADOBUTROL INJECTION: HCPCS | Performed by: PHYSICIAN ASSISTANT

## 2024-01-01 PROCEDURE — 250N000011 HC RX IP 250 OP 636

## 2024-01-01 PROCEDURE — 82247 BILIRUBIN TOTAL: CPT | Performed by: INTERNAL MEDICINE

## 2024-01-01 PROCEDURE — 81001 URINALYSIS AUTO W/SCOPE: CPT | Performed by: PHYSICIAN ASSISTANT

## 2024-01-01 PROCEDURE — 250N000012 HC RX MED GY IP 250 OP 636 PS 637: Performed by: NURSE PRACTITIONER

## 2024-01-01 PROCEDURE — 97110 THERAPEUTIC EXERCISES: CPT | Mod: GP

## 2024-01-01 PROCEDURE — 93970 EXTREMITY STUDY: CPT

## 2024-01-01 PROCEDURE — 37187 VENOUS MECH THROMBECTOMY: CPT

## 2024-01-01 PROCEDURE — 76705 ECHO EXAM OF ABDOMEN: CPT

## 2024-01-01 PROCEDURE — 36415 COLL VENOUS BLD VENIPUNCTURE: CPT | Performed by: EMERGENCY MEDICINE

## 2024-01-01 PROCEDURE — 250N000025 HC SEVOFLURANE, PER MIN: Performed by: INTERNAL MEDICINE

## 2024-01-01 PROCEDURE — 258N000003 HC RX IP 258 OP 636

## 2024-01-01 PROCEDURE — 255N000002 HC RX 255 OP 636: Performed by: INTERNAL MEDICINE

## 2024-01-01 PROCEDURE — 120N000001 HC R&B MED SURG/OB

## 2024-01-01 PROCEDURE — 250N000009 HC RX 250: Performed by: ANESTHESIOLOGY

## 2024-01-01 PROCEDURE — 86900 BLOOD TYPING SEROLOGIC ABO: CPT | Performed by: ANESTHESIOLOGY

## 2024-01-01 PROCEDURE — 71046 X-RAY EXAM CHEST 2 VIEWS: CPT

## 2024-01-01 PROCEDURE — 85520 HEPARIN ASSAY: CPT | Performed by: INTERNAL MEDICINE

## 2024-01-01 PROCEDURE — C9113 INJ PANTOPRAZOLE SODIUM, VIA: HCPCS | Performed by: INTERNAL MEDICINE

## 2024-01-01 PROCEDURE — 710N000012 HC RECOVERY PHASE 2, PER MINUTE: Performed by: INTERNAL MEDICINE

## 2024-01-01 PROCEDURE — 76937 US GUIDE VASCULAR ACCESS: CPT

## 2024-01-01 PROCEDURE — 370N000017 HC ANESTHESIA TECHNICAL FEE, PER MIN: Performed by: INTERNAL MEDICINE

## 2024-01-01 PROCEDURE — 250N000012 HC RX MED GY IP 250 OP 636 PS 637: Performed by: INTERNAL MEDICINE

## 2024-01-01 PROCEDURE — 85027 COMPLETE CBC AUTOMATED: CPT | Performed by: EMERGENCY MEDICINE

## 2024-01-01 PROCEDURE — C1757 CATH, THROMBECTOMY/EMBOLECT: HCPCS

## 2024-01-01 PROCEDURE — C1769 GUIDE WIRE: HCPCS | Performed by: INTERNAL MEDICINE

## 2024-01-01 PROCEDURE — 250N000013 HC RX MED GY IP 250 OP 250 PS 637: Performed by: INTERNAL MEDICINE

## 2024-01-01 PROCEDURE — 250N000009 HC RX 250

## 2024-01-01 PROCEDURE — 71045 X-RAY EXAM CHEST 1 VIEW: CPT

## 2024-01-01 PROCEDURE — 43274 ERCP DUCT STENT PLACEMENT: CPT

## 2024-01-01 PROCEDURE — 85007 BL SMEAR W/DIFF WBC COUNT: CPT | Performed by: INTERNAL MEDICINE

## 2024-01-01 PROCEDURE — G0463 HOSPITAL OUTPT CLINIC VISIT: HCPCS | Performed by: PHYSICIAN ASSISTANT

## 2024-01-01 PROCEDURE — 84155 ASSAY OF PROTEIN SERUM: CPT | Performed by: PHYSICIAN ASSISTANT

## 2024-01-01 PROCEDURE — 88321 CONSLTJ&REPRT SLD PREP ELSWR: CPT | Performed by: PATHOLOGY

## 2024-01-01 PROCEDURE — 83690 ASSAY OF LIPASE: CPT | Performed by: INTERNAL MEDICINE

## 2024-01-01 PROCEDURE — 258N000003 HC RX IP 258 OP 636: Performed by: ANESTHESIOLOGY

## 2024-01-01 PROCEDURE — 272N000566 HC SHEATH CR3

## 2024-01-01 PROCEDURE — 82040 ASSAY OF SERUM ALBUMIN: CPT | Performed by: INTERNAL MEDICINE

## 2024-01-01 PROCEDURE — 96374 THER/PROPH/DIAG INJ IV PUSH: CPT

## 2024-01-01 PROCEDURE — 272N000001 HC OR GENERAL SUPPLY STERILE: Performed by: INTERNAL MEDICINE

## 2024-01-01 PROCEDURE — 250N000011 HC RX IP 250 OP 636: Performed by: ANESTHESIOLOGY

## 2024-01-01 PROCEDURE — 999N000181 XR SURGERY CARM FLUORO GREATER THAN 5 MIN W STILLS: Mod: TC

## 2024-01-01 PROCEDURE — 80053 COMPREHEN METABOLIC PANEL: CPT | Performed by: INTERNAL MEDICINE

## 2024-01-01 PROCEDURE — 360N000082 HC SURGERY LEVEL 2 W/ FLUORO, PER MIN: Performed by: INTERNAL MEDICINE

## 2024-01-01 PROCEDURE — G2211 COMPLEX E/M VISIT ADD ON: HCPCS | Performed by: PHYSICIAN ASSISTANT

## 2024-01-01 PROCEDURE — 36415 COLL VENOUS BLD VENIPUNCTURE: CPT | Performed by: INTERNAL MEDICINE

## 2024-01-01 PROCEDURE — 99215 OFFICE O/P EST HI 40 MIN: CPT | Performed by: PHYSICIAN ASSISTANT

## 2024-01-01 PROCEDURE — 36012 PLACE CATHETER IN VEIN: CPT

## 2024-01-01 PROCEDURE — 99417 PROLNG OP E/M EACH 15 MIN: CPT | Mod: 95 | Performed by: INTERNAL MEDICINE

## 2024-01-01 PROCEDURE — C1769 GUIDE WIRE: HCPCS

## 2024-01-01 PROCEDURE — 75820 VEIN X-RAY ARM/LEG: CPT

## 2024-01-01 PROCEDURE — 36591 DRAW BLOOD OFF VENOUS DEVICE: CPT | Performed by: EMERGENCY MEDICINE

## 2024-01-01 PROCEDURE — 999N000141 HC STATISTIC PRE-PROCEDURE NURSING ASSESSMENT: Performed by: INTERNAL MEDICINE

## 2024-01-01 PROCEDURE — 99285 EMERGENCY DEPT VISIT HI MDM: CPT | Mod: 25

## 2024-01-01 PROCEDURE — 87040 BLOOD CULTURE FOR BACTERIA: CPT | Performed by: PHYSICIAN ASSISTANT

## 2024-01-01 PROCEDURE — 272N000500 HC NEEDLE CR2

## 2024-01-01 PROCEDURE — C1726 CATH, BAL DIL, NON-VASCULAR: HCPCS | Performed by: INTERNAL MEDICINE

## 2024-01-01 PROCEDURE — 250N000011 HC RX IP 250 OP 636: Performed by: NURSE ANESTHETIST, CERTIFIED REGISTERED

## 2024-01-01 PROCEDURE — 83036 HEMOGLOBIN GLYCOSYLATED A1C: CPT | Performed by: INTERNAL MEDICINE

## 2024-01-01 PROCEDURE — 250N000013 HC RX MED GY IP 250 OP 250 PS 637: Performed by: ANESTHESIOLOGY

## 2024-01-01 PROCEDURE — 80076 HEPATIC FUNCTION PANEL: CPT | Performed by: INTERNAL MEDICINE

## 2024-01-01 PROCEDURE — 99153 MOD SED SAME PHYS/QHP EA: CPT

## 2024-01-01 PROCEDURE — 74176 CT ABD & PELVIS W/O CONTRAST: CPT

## 2024-01-01 PROCEDURE — 99417 PROLNG OP E/M EACH 15 MIN: CPT | Mod: 95 | Performed by: PHYSICIAN ASSISTANT

## 2024-01-01 PROCEDURE — 97161 PT EVAL LOW COMPLEX 20 MIN: CPT | Mod: GP

## 2024-01-01 PROCEDURE — 84484 ASSAY OF TROPONIN QUANT: CPT | Performed by: EMERGENCY MEDICINE

## 2024-01-01 PROCEDURE — 272N000117 HC CATH CR2

## 2024-01-01 PROCEDURE — C2617 STENT, NON-COR, TEM W/O DEL: HCPCS | Performed by: INTERNAL MEDICINE

## 2024-01-01 PROCEDURE — 99239 HOSP IP/OBS DSCHRG MGMT >30: CPT | Performed by: INTERNAL MEDICINE

## 2024-01-01 PROCEDURE — 71250 CT THORAX DX C-: CPT

## 2024-01-01 PROCEDURE — 258N000003 HC RX IP 258 OP 636: Performed by: NURSE ANESTHETIST, CERTIFIED REGISTERED

## 2024-01-01 PROCEDURE — 36591 DRAW BLOOD OFF VENOUS DEVICE: CPT | Performed by: INTERNAL MEDICINE

## 2024-01-01 PROCEDURE — 97165 OT EVAL LOW COMPLEX 30 MIN: CPT | Mod: GO

## 2024-01-01 PROCEDURE — 36591 DRAW BLOOD OFF VENOUS DEVICE: CPT | Performed by: PHYSICIAN ASSISTANT

## 2024-01-01 DEVICE — STENT ZIMMON PANCREA 7FRX15CM SGL PIGTAIL SPSOF-7-15 G22983: Type: IMPLANTABLE DEVICE | Site: BILE DUCT | Status: FUNCTIONAL

## 2024-01-01 DEVICE — STENT ZIMMON PANCREAS 7FRX18CM SGL PIGTAIL G24586: Type: IMPLANTABLE DEVICE | Site: BILE DUCT | Status: FUNCTIONAL

## 2024-01-01 RX ORDER — DOXYCYCLINE 100 MG/1
100 CAPSULE ORAL 2 TIMES DAILY
Qty: 14 CAPSULE | Refills: 0 | Status: SHIPPED | OUTPATIENT
Start: 2024-01-01 | End: 2024-01-01

## 2024-01-01 RX ORDER — ATROPINE SULFATE 0.4 MG/ML
0.4 AMPUL (ML) INJECTION
Status: CANCELLED | OUTPATIENT
Start: 2024-01-01

## 2024-01-01 RX ORDER — NALOXONE HYDROCHLORIDE 0.4 MG/ML
0.4 INJECTION, SOLUTION INTRAMUSCULAR; INTRAVENOUS; SUBCUTANEOUS
Status: DISCONTINUED | OUTPATIENT
Start: 2024-01-01 | End: 2024-01-01 | Stop reason: HOSPADM

## 2024-01-01 RX ORDER — HYDROMORPHONE HYDROCHLORIDE 2 MG/1
2 TABLET ORAL EVERY 4 HOURS PRN
Status: DISCONTINUED | OUTPATIENT
Start: 2024-01-01 | End: 2024-01-01 | Stop reason: HOSPADM

## 2024-01-01 RX ORDER — METHYLPREDNISOLONE 32 MG/1
32 TABLET ORAL ONCE
Status: COMPLETED | OUTPATIENT
Start: 2024-01-01 | End: 2024-01-01

## 2024-01-01 RX ORDER — ALBUTEROL SULFATE 90 UG/1
1-2 AEROSOL, METERED RESPIRATORY (INHALATION)
Status: CANCELLED
Start: 2024-01-01

## 2024-01-01 RX ORDER — SODIUM CHLORIDE, SODIUM LACTATE, POTASSIUM CHLORIDE, CALCIUM CHLORIDE 600; 310; 30; 20 MG/100ML; MG/100ML; MG/100ML; MG/100ML
INJECTION, SOLUTION INTRAVENOUS CONTINUOUS PRN
Status: DISCONTINUED | OUTPATIENT
Start: 2024-01-01 | End: 2024-01-01

## 2024-01-01 RX ORDER — METHYLPREDNISOLONE SODIUM SUCCINATE 125 MG/2ML
125 INJECTION, POWDER, LYOPHILIZED, FOR SOLUTION INTRAMUSCULAR; INTRAVENOUS
Status: CANCELLED
Start: 2024-01-01

## 2024-01-01 RX ORDER — HEPARIN SODIUM (PORCINE) LOCK FLUSH IV SOLN 100 UNIT/ML 100 UNIT/ML
5 SOLUTION INTRAVENOUS
Status: CANCELLED | OUTPATIENT
Start: 2024-01-01

## 2024-01-01 RX ORDER — NALOXONE HYDROCHLORIDE 0.4 MG/ML
0.1 INJECTION, SOLUTION INTRAMUSCULAR; INTRAVENOUS; SUBCUTANEOUS
Status: DISCONTINUED | OUTPATIENT
Start: 2024-01-01 | End: 2024-01-01 | Stop reason: HOSPADM

## 2024-01-01 RX ORDER — FLUMAZENIL 0.1 MG/ML
0.2 INJECTION, SOLUTION INTRAVENOUS
Status: DISCONTINUED | OUTPATIENT
Start: 2024-01-01 | End: 2024-01-01 | Stop reason: HOSPADM

## 2024-01-01 RX ORDER — OXYCODONE HYDROCHLORIDE 5 MG/1
5 TABLET ORAL EVERY 4 HOURS PRN
Status: DISCONTINUED | OUTPATIENT
Start: 2024-01-01 | End: 2024-01-01 | Stop reason: HOSPADM

## 2024-01-01 RX ORDER — SPIRONOLACTONE 25 MG/1
12.5 TABLET ORAL DAILY
Qty: 30 TABLET | Refills: 0 | Status: SHIPPED | OUTPATIENT
Start: 2024-01-01

## 2024-01-01 RX ORDER — FENTANYL CITRATE 50 UG/ML
25-50 INJECTION, SOLUTION INTRAMUSCULAR; INTRAVENOUS EVERY 5 MIN PRN
Status: DISCONTINUED | OUTPATIENT
Start: 2024-01-01 | End: 2024-01-01

## 2024-01-01 RX ORDER — NALOXONE HYDROCHLORIDE 0.4 MG/ML
0.2 INJECTION, SOLUTION INTRAMUSCULAR; INTRAVENOUS; SUBCUTANEOUS
Status: DISCONTINUED | OUTPATIENT
Start: 2024-01-01 | End: 2024-01-01

## 2024-01-01 RX ORDER — HEPARIN SODIUM (PORCINE) LOCK FLUSH IV SOLN 100 UNIT/ML 100 UNIT/ML
5-10 SOLUTION INTRAVENOUS
Status: DISCONTINUED | OUTPATIENT
Start: 2024-01-01 | End: 2024-01-01 | Stop reason: HOSPADM

## 2024-01-01 RX ORDER — ONDANSETRON 2 MG/ML
4 INJECTION INTRAMUSCULAR; INTRAVENOUS EVERY 30 MIN PRN
Status: DISCONTINUED | OUTPATIENT
Start: 2024-01-01 | End: 2024-01-01 | Stop reason: HOSPADM

## 2024-01-01 RX ORDER — FLUMAZENIL 0.1 MG/ML
0.2 INJECTION, SOLUTION INTRAVENOUS
Status: DISCONTINUED | OUTPATIENT
Start: 2024-01-01 | End: 2024-01-01

## 2024-01-01 RX ORDER — CETIRIZINE HYDROCHLORIDE 10 MG/1
10 TABLET ORAL DAILY
COMMUNITY

## 2024-01-01 RX ORDER — DIPHENHYDRAMINE HYDROCHLORIDE 50 MG/ML
50 INJECTION INTRAMUSCULAR; INTRAVENOUS ONCE
Status: COMPLETED | OUTPATIENT
Start: 2024-01-01 | End: 2024-01-01

## 2024-01-01 RX ORDER — HEPARIN SODIUM,PORCINE 10 UNIT/ML
5-20 VIAL (ML) INTRAVENOUS DAILY PRN
Status: CANCELLED | OUTPATIENT
Start: 2024-01-01

## 2024-01-01 RX ORDER — SODIUM PHOSPHATE,MONO-DIBASIC 19G-7G/118
500 ENEMA (ML) RECTAL DAILY
COMMUNITY

## 2024-01-01 RX ORDER — HEPARIN SODIUM,PORCINE 10 UNIT/ML
5-10 VIAL (ML) INTRAVENOUS
Status: CANCELLED | OUTPATIENT
Start: 2024-01-01

## 2024-01-01 RX ORDER — ACETAMINOPHEN 325 MG/1
975 TABLET ORAL ONCE
Status: COMPLETED | OUTPATIENT
Start: 2024-01-01 | End: 2024-01-01

## 2024-01-01 RX ORDER — FENTANYL CITRATE 50 UG/ML
25-50 INJECTION, SOLUTION INTRAMUSCULAR; INTRAVENOUS EVERY 5 MIN PRN
Status: DISCONTINUED | OUTPATIENT
Start: 2024-01-01 | End: 2024-01-01 | Stop reason: HOSPADM

## 2024-01-01 RX ORDER — LOPERAMIDE HCL 2 MG
CAPSULE ORAL
Qty: 30 CAPSULE | Refills: 0 | Status: SHIPPED | OUTPATIENT
Start: 2024-01-01 | End: 2024-01-01

## 2024-01-01 RX ORDER — ALBUTEROL SULFATE 0.83 MG/ML
2.5 SOLUTION RESPIRATORY (INHALATION)
Status: CANCELLED | OUTPATIENT
Start: 2024-01-01

## 2024-01-01 RX ORDER — ENOXAPARIN SODIUM 150 MG/ML
120 INJECTION SUBCUTANEOUS
Status: ON HOLD | COMMUNITY
Start: 2024-01-01 | End: 2024-01-01

## 2024-01-01 RX ORDER — ENOXAPARIN SODIUM 100 MG/ML
1 INJECTION SUBCUTANEOUS EVERY 12 HOURS
Qty: 144 ML | Refills: 0 | Status: SHIPPED | OUTPATIENT
Start: 2024-01-01 | End: 2024-07-08

## 2024-01-01 RX ORDER — METOPROLOL TARTRATE 1 MG/ML
1-2 INJECTION, SOLUTION INTRAVENOUS EVERY 5 MIN PRN
Status: DISCONTINUED | OUTPATIENT
Start: 2024-01-01 | End: 2024-01-01 | Stop reason: HOSPADM

## 2024-01-01 RX ORDER — FAMOTIDINE 20 MG
2 TABLET ORAL 3 TIMES DAILY
COMMUNITY

## 2024-01-01 RX ORDER — SODIUM CHLORIDE 9 MG/ML
10 INJECTION, SOLUTION INTRAVENOUS
Status: ON HOLD | COMMUNITY
End: 2024-01-01

## 2024-01-01 RX ORDER — HEPARIN SODIUM,PORCINE 10 UNIT/ML
5-10 VIAL (ML) INTRAVENOUS
Status: DISCONTINUED | OUTPATIENT
Start: 2024-01-01 | End: 2024-01-01 | Stop reason: HOSPADM

## 2024-01-01 RX ORDER — CEFAZOLIN SODIUM/WATER 2 G/20 ML
2 SYRINGE (ML) INTRAVENOUS
Status: COMPLETED | OUTPATIENT
Start: 2024-01-01 | End: 2024-01-01

## 2024-01-01 RX ORDER — FENTANYL CITRATE 50 UG/ML
50 INJECTION, SOLUTION INTRAMUSCULAR; INTRAVENOUS EVERY 5 MIN PRN
Status: DISCONTINUED | OUTPATIENT
Start: 2024-01-01 | End: 2024-01-01 | Stop reason: HOSPADM

## 2024-01-01 RX ORDER — LIDOCAINE HYDROCHLORIDE 20 MG/ML
INJECTION, SOLUTION INFILTRATION; PERINEURAL PRN
Status: DISCONTINUED | OUTPATIENT
Start: 2024-01-01 | End: 2024-01-01

## 2024-01-01 RX ORDER — HEPARIN SODIUM,PORCINE 10 UNIT/ML
5-10 VIAL (ML) INTRAVENOUS EVERY 24 HOURS
Status: DISCONTINUED | OUTPATIENT
Start: 2024-01-01 | End: 2024-01-01 | Stop reason: HOSPADM

## 2024-01-01 RX ORDER — PROCHLORPERAZINE MALEATE 10 MG
5 TABLET ORAL EVERY 6 HOURS PRN
Qty: 30 TABLET | Refills: 2 | Status: SHIPPED | OUTPATIENT
Start: 2024-01-01 | End: 2024-01-01

## 2024-01-01 RX ORDER — SODIUM CHLORIDE, SODIUM LACTATE, POTASSIUM CHLORIDE, CALCIUM CHLORIDE 600; 310; 30; 20 MG/100ML; MG/100ML; MG/100ML; MG/100ML
INJECTION, SOLUTION INTRAVENOUS CONTINUOUS
Status: DISCONTINUED | OUTPATIENT
Start: 2024-01-01 | End: 2024-01-01 | Stop reason: HOSPADM

## 2024-01-01 RX ORDER — NALOXONE HYDROCHLORIDE 0.4 MG/ML
0.4 INJECTION, SOLUTION INTRAMUSCULAR; INTRAVENOUS; SUBCUTANEOUS
Status: DISCONTINUED | OUTPATIENT
Start: 2024-01-01 | End: 2024-01-01

## 2024-01-01 RX ORDER — PROPOFOL 10 MG/ML
INJECTION, EMULSION INTRAVENOUS PRN
Status: DISCONTINUED | OUTPATIENT
Start: 2024-01-01 | End: 2024-01-01

## 2024-01-01 RX ORDER — ACETAMINOPHEN 325 MG/1
650 TABLET ORAL EVERY 4 HOURS PRN
Status: DISCONTINUED | OUTPATIENT
Start: 2024-01-01 | End: 2024-01-01 | Stop reason: HOSPADM

## 2024-01-01 RX ORDER — GADOBUTROL 604.72 MG/ML
7 INJECTION INTRAVENOUS ONCE
Status: COMPLETED | OUTPATIENT
Start: 2024-01-01 | End: 2024-01-01

## 2024-01-01 RX ORDER — ONDANSETRON 2 MG/ML
4 INJECTION INTRAMUSCULAR; INTRAVENOUS EVERY 6 HOURS PRN
Status: DISCONTINUED | OUTPATIENT
Start: 2024-01-01 | End: 2024-01-01 | Stop reason: HOSPADM

## 2024-01-01 RX ORDER — HEPARIN SODIUM (PORCINE) LOCK FLUSH IV SOLN 100 UNIT/ML 100 UNIT/ML
5 SOLUTION INTRAVENOUS ONCE
Status: COMPLETED | OUTPATIENT
Start: 2024-01-01 | End: 2024-01-01

## 2024-01-01 RX ORDER — NALOXONE HYDROCHLORIDE 0.4 MG/ML
0.2 INJECTION, SOLUTION INTRAMUSCULAR; INTRAVENOUS; SUBCUTANEOUS
Status: DISCONTINUED | OUTPATIENT
Start: 2024-01-01 | End: 2024-01-01 | Stop reason: HOSPADM

## 2024-01-01 RX ORDER — LORAZEPAM 2 MG/ML
0.5 INJECTION INTRAMUSCULAR EVERY 4 HOURS PRN
Status: CANCELLED | OUTPATIENT
Start: 2024-01-01

## 2024-01-01 RX ORDER — PIPERACILLIN SODIUM, TAZOBACTAM SODIUM 3; .375 G/15ML; G/15ML
3.38 INJECTION, POWDER, LYOPHILIZED, FOR SOLUTION INTRAVENOUS ONCE
Status: COMPLETED | OUTPATIENT
Start: 2024-01-01 | End: 2024-01-01

## 2024-01-01 RX ORDER — SODIUM CHLORIDE 9 MG/ML
INJECTION, SOLUTION INTRAVENOUS CONTINUOUS
Status: DISCONTINUED | OUTPATIENT
Start: 2024-01-01 | End: 2024-01-01 | Stop reason: HOSPADM

## 2024-01-01 RX ORDER — LEVOFLOXACIN 5 MG/ML
500 INJECTION, SOLUTION INTRAVENOUS ONCE
Status: COMPLETED | OUTPATIENT
Start: 2024-01-01 | End: 2024-01-01

## 2024-01-01 RX ORDER — ASCORBIC ACID 500 MG/5ML
500 LIQUID (ML) ORAL
COMMUNITY
Start: 2023-01-01

## 2024-01-01 RX ORDER — MEPERIDINE HYDROCHLORIDE 25 MG/ML
25 INJECTION INTRAMUSCULAR; INTRAVENOUS; SUBCUTANEOUS EVERY 30 MIN PRN
Status: CANCELLED | OUTPATIENT
Start: 2024-01-01

## 2024-01-01 RX ORDER — PROCHLORPERAZINE MALEATE 10 MG
5 TABLET ORAL EVERY 6 HOURS PRN
Qty: 30 TABLET | Refills: 2 | Status: SHIPPED | OUTPATIENT
Start: 2024-01-01

## 2024-01-01 RX ORDER — LIDOCAINE HYDROCHLORIDE AND EPINEPHRINE 10; 10 MG/ML; UG/ML
INJECTION, SOLUTION INFILTRATION; PERINEURAL PRN
Status: COMPLETED | OUTPATIENT
Start: 2024-01-01 | End: 2024-01-01

## 2024-01-01 RX ORDER — PIPERACILLIN SODIUM, TAZOBACTAM SODIUM 3; .375 G/15ML; G/15ML
3.38 INJECTION, POWDER, LYOPHILIZED, FOR SOLUTION INTRAVENOUS EVERY 8 HOURS
Status: DISCONTINUED | OUTPATIENT
Start: 2024-01-01 | End: 2024-01-01

## 2024-01-01 RX ORDER — ACETAMINOPHEN 650 MG/1
650 SUPPOSITORY RECTAL EVERY 4 HOURS PRN
Status: DISCONTINUED | OUTPATIENT
Start: 2024-01-01 | End: 2024-01-01 | Stop reason: HOSPADM

## 2024-01-01 RX ORDER — EPINEPHRINE 1 MG/ML
0.3 INJECTION, SOLUTION INTRAMUSCULAR; SUBCUTANEOUS EVERY 5 MIN PRN
Status: CANCELLED | OUTPATIENT
Start: 2024-01-01

## 2024-01-01 RX ORDER — ONDANSETRON 2 MG/ML
INJECTION INTRAMUSCULAR; INTRAVENOUS PRN
Status: DISCONTINUED | OUTPATIENT
Start: 2024-01-01 | End: 2024-01-01

## 2024-01-01 RX ORDER — VITAMIN E 268 MG
180 CAPSULE ORAL DAILY
Status: ON HOLD | COMMUNITY
End: 2024-01-01

## 2024-01-01 RX ORDER — EPHEDRINE SULFATE 50 MG/ML
INJECTION, SOLUTION INTRAMUSCULAR; INTRAVENOUS; SUBCUTANEOUS PRN
Status: DISCONTINUED | OUTPATIENT
Start: 2024-01-01 | End: 2024-01-01

## 2024-01-01 RX ORDER — HYDROMORPHONE HYDROCHLORIDE 2 MG/1
2-4 TABLET ORAL EVERY 4 HOURS PRN
Qty: 60 TABLET | Refills: 0 | Status: SHIPPED | OUTPATIENT
Start: 2024-01-01 | End: 2024-05-03

## 2024-01-01 RX ORDER — INDOMETHACIN 50 MG/1
SUPPOSITORY RECTAL PRN
Status: DISCONTINUED | OUTPATIENT
Start: 2024-01-01 | End: 2024-01-01 | Stop reason: HOSPADM

## 2024-01-01 RX ORDER — LIDOCAINE 40 MG/G
CREAM TOPICAL
Status: DISCONTINUED | OUTPATIENT
Start: 2024-01-01 | End: 2024-01-01 | Stop reason: HOSPADM

## 2024-01-01 RX ORDER — HYDROMORPHONE HYDROCHLORIDE 1 MG/ML
0.3 INJECTION, SOLUTION INTRAMUSCULAR; INTRAVENOUS; SUBCUTANEOUS EVERY 4 HOURS PRN
Status: DISCONTINUED | OUTPATIENT
Start: 2024-01-01 | End: 2024-01-01 | Stop reason: HOSPADM

## 2024-01-01 RX ORDER — HEPARIN SODIUM,PORCINE 10 UNIT/ML
5-10 VIAL (ML) INTRAVENOUS EVERY 24 HOURS
Status: CANCELLED | OUTPATIENT
Start: 2024-01-01

## 2024-01-01 RX ORDER — ONDANSETRON 2 MG/ML
4 INJECTION INTRAMUSCULAR; INTRAVENOUS
Status: DISCONTINUED | OUTPATIENT
Start: 2024-01-01 | End: 2024-01-01 | Stop reason: HOSPADM

## 2024-01-01 RX ORDER — DIPHENHYDRAMINE HYDROCHLORIDE 50 MG/ML
50 INJECTION INTRAMUSCULAR; INTRAVENOUS
Status: CANCELLED
Start: 2024-01-01

## 2024-01-01 RX ORDER — METRONIDAZOLE 500 MG/1
500 TABLET ORAL 3 TIMES DAILY
Qty: 21 TABLET | Refills: 0 | Status: ON HOLD | OUTPATIENT
Start: 2024-01-01 | End: 2024-01-01

## 2024-01-01 RX ORDER — INDOMETHACIN 50 MG/1
100 SUPPOSITORY RECTAL
Status: DISCONTINUED | OUTPATIENT
Start: 2024-01-01 | End: 2024-01-01 | Stop reason: HOSPADM

## 2024-01-01 RX ORDER — CETIRIZINE HYDROCHLORIDE 10 MG/1
10 TABLET ORAL DAILY
Status: DISCONTINUED | OUTPATIENT
Start: 2024-01-01 | End: 2024-01-01 | Stop reason: HOSPADM

## 2024-01-01 RX ORDER — DEXTROSE MONOHYDRATE 25 G/50ML
25-50 INJECTION, SOLUTION INTRAVENOUS
Status: DISCONTINUED | OUTPATIENT
Start: 2024-01-01 | End: 2024-01-01 | Stop reason: HOSPADM

## 2024-01-01 RX ORDER — AMOXICILLIN 250 MG
1 CAPSULE ORAL 2 TIMES DAILY PRN
Status: DISCONTINUED | OUTPATIENT
Start: 2024-01-01 | End: 2024-01-01 | Stop reason: HOSPADM

## 2024-01-01 RX ORDER — HYDRALAZINE HYDROCHLORIDE 20 MG/ML
2.5-5 INJECTION INTRAMUSCULAR; INTRAVENOUS EVERY 10 MIN PRN
Status: DISCONTINUED | OUTPATIENT
Start: 2024-01-01 | End: 2024-01-01 | Stop reason: HOSPADM

## 2024-01-01 RX ORDER — ENOXAPARIN SODIUM 150 MG/ML
120 INJECTION SUBCUTANEOUS DAILY
Qty: 24 ML | Refills: 1 | OUTPATIENT
Start: 2024-01-01 | End: 2024-08-08

## 2024-01-01 RX ORDER — CALCIUM CARBONATE 500 MG/1
1000 TABLET, CHEWABLE ORAL 4 TIMES DAILY PRN
Status: DISCONTINUED | OUTPATIENT
Start: 2024-01-01 | End: 2024-01-01 | Stop reason: HOSPADM

## 2024-01-01 RX ORDER — HEPARIN SODIUM 1000 [USP'U]/ML
3000 INJECTION, SOLUTION INTRAVENOUS; SUBCUTANEOUS ONCE
Status: COMPLETED | OUTPATIENT
Start: 2024-01-01 | End: 2024-01-01

## 2024-01-01 RX ORDER — HYDROMORPHONE HCL IN WATER/PF 6 MG/30 ML
0.4 PATIENT CONTROLLED ANALGESIA SYRINGE INTRAVENOUS EVERY 5 MIN PRN
Status: DISCONTINUED | OUTPATIENT
Start: 2024-01-01 | End: 2024-01-01 | Stop reason: HOSPADM

## 2024-01-01 RX ORDER — FUROSEMIDE 20 MG
20 TABLET ORAL DAILY
Qty: 30 TABLET | Refills: 0 | Status: SHIPPED | OUTPATIENT
Start: 2024-01-01

## 2024-01-01 RX ORDER — FENTANYL CITRATE 50 UG/ML
25 INJECTION, SOLUTION INTRAMUSCULAR; INTRAVENOUS
Status: DISCONTINUED | OUTPATIENT
Start: 2024-01-01 | End: 2024-01-01 | Stop reason: HOSPADM

## 2024-01-01 RX ORDER — HEPARIN SODIUM (PORCINE) LOCK FLUSH IV SOLN 100 UNIT/ML 100 UNIT/ML
500 SOLUTION INTRAVENOUS ONCE
Status: COMPLETED | OUTPATIENT
Start: 2024-01-01 | End: 2024-01-01

## 2024-01-01 RX ORDER — ONDANSETRON 4 MG/1
4 TABLET, ORALLY DISINTEGRATING ORAL EVERY 30 MIN PRN
Status: DISCONTINUED | OUTPATIENT
Start: 2024-01-01 | End: 2024-01-01 | Stop reason: HOSPADM

## 2024-01-01 RX ORDER — HEPARIN SODIUM (PORCINE) LOCK FLUSH IV SOLN 100 UNIT/ML 100 UNIT/ML
5-10 SOLUTION INTRAVENOUS
Status: CANCELLED | OUTPATIENT
Start: 2024-01-01

## 2024-01-01 RX ORDER — IOPAMIDOL 510 MG/ML
INJECTION, SOLUTION INTRAVASCULAR PRN
Status: DISCONTINUED | OUTPATIENT
Start: 2024-01-01 | End: 2024-01-01 | Stop reason: HOSPADM

## 2024-01-01 RX ORDER — ONDANSETRON 4 MG/1
4 TABLET, ORALLY DISINTEGRATING ORAL EVERY 6 HOURS PRN
Status: DISCONTINUED | OUTPATIENT
Start: 2024-01-01 | End: 2024-01-01 | Stop reason: HOSPADM

## 2024-01-01 RX ORDER — FENTANYL CITRATE 50 UG/ML
INJECTION, SOLUTION INTRAMUSCULAR; INTRAVENOUS PRN
Status: DISCONTINUED | OUTPATIENT
Start: 2024-01-01 | End: 2024-01-01

## 2024-01-01 RX ORDER — HEPARIN SODIUM 200 [USP'U]/100ML
1 INJECTION, SOLUTION INTRAVENOUS CONTINUOUS PRN
Status: DISCONTINUED | OUTPATIENT
Start: 2024-01-01 | End: 2024-01-01 | Stop reason: HOSPADM

## 2024-01-01 RX ORDER — DEXAMETHASONE SODIUM PHOSPHATE 4 MG/ML
INJECTION, SOLUTION INTRA-ARTICULAR; INTRALESIONAL; INTRAMUSCULAR; INTRAVENOUS; SOFT TISSUE PRN
Status: DISCONTINUED | OUTPATIENT
Start: 2024-01-01 | End: 2024-01-01

## 2024-01-01 RX ORDER — OXYCODONE HYDROCHLORIDE 10 MG/1
10 TABLET ORAL EVERY 4 HOURS PRN
Status: DISCONTINUED | OUTPATIENT
Start: 2024-01-01 | End: 2024-01-01 | Stop reason: HOSPADM

## 2024-01-01 RX ORDER — CIPROFLOXACIN 500 MG/1
500 TABLET, FILM COATED ORAL 2 TIMES DAILY
Qty: 14 TABLET | Refills: 0 | Status: SHIPPED | OUTPATIENT
Start: 2024-01-01 | End: 2024-01-01

## 2024-01-01 RX ORDER — ENOXAPARIN SODIUM 100 MG/ML
1 INJECTION SUBCUTANEOUS EVERY 12 HOURS
Status: DISCONTINUED | OUTPATIENT
Start: 2024-01-01 | End: 2024-01-01 | Stop reason: HOSPADM

## 2024-01-01 RX ORDER — NICOTINE POLACRILEX 4 MG
15-30 LOZENGE BUCCAL
Status: DISCONTINUED | OUTPATIENT
Start: 2024-01-01 | End: 2024-01-01 | Stop reason: HOSPADM

## 2024-01-01 RX ORDER — SODIUM CHLORIDE 9 MG/ML
INJECTION, SOLUTION INTRAVENOUS CONTINUOUS
Status: DISCONTINUED | OUTPATIENT
Start: 2024-01-01 | End: 2024-01-01

## 2024-01-01 RX ORDER — FENTANYL CITRATE 50 UG/ML
25 INJECTION, SOLUTION INTRAMUSCULAR; INTRAVENOUS EVERY 5 MIN PRN
Status: DISCONTINUED | OUTPATIENT
Start: 2024-01-01 | End: 2024-01-01 | Stop reason: HOSPADM

## 2024-01-01 RX ORDER — AMOXICILLIN 250 MG
2 CAPSULE ORAL 2 TIMES DAILY PRN
Status: DISCONTINUED | OUTPATIENT
Start: 2024-01-01 | End: 2024-01-01 | Stop reason: HOSPADM

## 2024-01-01 RX ORDER — HYDROMORPHONE HCL IN WATER/PF 6 MG/30 ML
0.2 PATIENT CONTROLLED ANALGESIA SYRINGE INTRAVENOUS EVERY 5 MIN PRN
Status: DISCONTINUED | OUTPATIENT
Start: 2024-01-01 | End: 2024-01-01 | Stop reason: HOSPADM

## 2024-01-01 RX ORDER — HEPARIN SODIUM 10000 [USP'U]/100ML
0-5000 INJECTION, SOLUTION INTRAVENOUS CONTINUOUS
Status: DISCONTINUED | OUTPATIENT
Start: 2024-01-01 | End: 2024-01-01

## 2024-01-01 RX ADMIN — FENTANYL CITRATE 50 MCG: 50 INJECTION INTRAMUSCULAR; INTRAVENOUS at 12:24

## 2024-01-01 RX ADMIN — METHYLPREDNISOLONE 32 MG: 32 TABLET ORAL at 20:47

## 2024-01-01 RX ADMIN — PANTOPRAZOLE SODIUM 40 MG: 40 INJECTION, POWDER, FOR SOLUTION INTRAVENOUS at 17:10

## 2024-01-01 RX ADMIN — SODIUM CHLORIDE: 9 INJECTION, SOLUTION INTRAVENOUS at 06:02

## 2024-01-01 RX ADMIN — PHENYLEPHRINE HYDROCHLORIDE 200 MCG: 10 INJECTION INTRAVENOUS at 12:10

## 2024-01-01 RX ADMIN — SODIUM CHLORIDE: 9 INJECTION, SOLUTION INTRAVENOUS at 16:01

## 2024-01-01 RX ADMIN — HEPARIN SODIUM AND DEXTROSE 1500 UNITS/HR: 10000; 5 INJECTION INTRAVENOUS at 04:58

## 2024-01-01 RX ADMIN — ENOXAPARIN SODIUM 80 MG: 80 INJECTION SUBCUTANEOUS at 11:38

## 2024-01-01 RX ADMIN — HEPARIN SODIUM 3000 UNITS: 1000 INJECTION INTRAVENOUS; SUBCUTANEOUS at 09:20

## 2024-01-01 RX ADMIN — SODIUM CHLORIDE: 9 INJECTION, SOLUTION INTRAVENOUS at 12:58

## 2024-01-01 RX ADMIN — AMOXICILLIN AND CLAVULANATE POTASSIUM 1 TABLET: 875; 125 TABLET, FILM COATED ORAL at 08:59

## 2024-01-01 RX ADMIN — PANCRELIPASE 2 CAPSULE: 60000; 12000; 38000 CAPSULE, DELAYED RELEASE PELLETS ORAL at 16:45

## 2024-01-01 RX ADMIN — LIDOCAINE HYDROCHLORIDE 80 MG: 20 INJECTION, SOLUTION INFILTRATION; PERINEURAL at 11:50

## 2024-01-01 RX ADMIN — PANTOPRAZOLE SODIUM 40 MG: 40 INJECTION, POWDER, FOR SOLUTION INTRAVENOUS at 08:32

## 2024-01-01 RX ADMIN — PANTOPRAZOLE SODIUM 40 MG: 40 INJECTION, POWDER, FOR SOLUTION INTRAVENOUS at 08:03

## 2024-01-01 RX ADMIN — PIPERACILLIN AND TAZOBACTAM 3.38 G: 3; .375 INJECTION, POWDER, FOR SOLUTION INTRAVENOUS at 16:01

## 2024-01-01 RX ADMIN — CETIRIZINE HYDROCHLORIDE 10 MG: 10 TABLET, FILM COATED ORAL at 08:32

## 2024-01-01 RX ADMIN — DEXAMETHASONE SODIUM PHOSPHATE 4 MG: 4 INJECTION, SOLUTION INTRA-ARTICULAR; INTRALESIONAL; INTRAMUSCULAR; INTRAVENOUS; SOFT TISSUE at 11:56

## 2024-01-01 RX ADMIN — PANCRELIPASE 2 CAPSULE: 60000; 12000; 38000 CAPSULE, DELAYED RELEASE PELLETS ORAL at 08:33

## 2024-01-01 RX ADMIN — FENTANYL CITRATE 25 MCG: 50 INJECTION, SOLUTION INTRAMUSCULAR; INTRAVENOUS at 09:07

## 2024-01-01 RX ADMIN — INSULIN ASPART 1 UNITS: 100 INJECTION, SOLUTION INTRAVENOUS; SUBCUTANEOUS at 12:34

## 2024-01-01 RX ADMIN — PHENYLEPHRINE HYDROCHLORIDE 100 MCG: 10 INJECTION INTRAVENOUS at 12:31

## 2024-01-01 RX ADMIN — PIPERACILLIN AND TAZOBACTAM 3.38 G: 3; .375 INJECTION, POWDER, FOR SOLUTION INTRAVENOUS at 23:47

## 2024-01-01 RX ADMIN — SODIUM CHLORIDE 500 ML: 9 INJECTION, SOLUTION INTRAVENOUS at 12:58

## 2024-01-01 RX ADMIN — ENOXAPARIN SODIUM 80 MG: 80 INJECTION SUBCUTANEOUS at 10:07

## 2024-01-01 RX ADMIN — Medication 5 ML: at 10:49

## 2024-01-01 RX ADMIN — FENTANYL CITRATE 50 MCG: 50 INJECTION, SOLUTION INTRAMUSCULAR; INTRAVENOUS at 12:59

## 2024-01-01 RX ADMIN — PIPERACILLIN AND TAZOBACTAM 3.38 G: 3; .375 INJECTION, POWDER, FOR SOLUTION INTRAVENOUS at 08:23

## 2024-01-01 RX ADMIN — ENOXAPARIN SODIUM 80 MG: 80 INJECTION SUBCUTANEOUS at 21:17

## 2024-01-01 RX ADMIN — PANCRELIPASE 2 CAPSULE: 60000; 12000; 38000 CAPSULE, DELAYED RELEASE PELLETS ORAL at 08:24

## 2024-01-01 RX ADMIN — CETIRIZINE HYDROCHLORIDE 10 MG: 10 TABLET, FILM COATED ORAL at 08:59

## 2024-01-01 RX ADMIN — MIDAZOLAM HYDROCHLORIDE 0.5 MG: 1 INJECTION, SOLUTION INTRAMUSCULAR; INTRAVENOUS at 13:08

## 2024-01-01 RX ADMIN — DIPHENHYDRAMINE HYDROCHLORIDE 50 MG: 50 INJECTION INTRAMUSCULAR; INTRAVENOUS at 09:05

## 2024-01-01 RX ADMIN — LIDOCAINE HYDROCHLORIDE AND EPINEPHRINE 5 ML: 10; 10 INJECTION, SOLUTION INFILTRATION; PERINEURAL at 13:12

## 2024-01-01 RX ADMIN — LIDOCAINE HYDROCHLORIDE 15 ML: 10 INJECTION, SOLUTION EPIDURAL; INFILTRATION; INTRACAUDAL; PERINEURAL at 09:13

## 2024-01-01 RX ADMIN — PHENYLEPHRINE HYDROCHLORIDE 0.8 MCG/KG/MIN: 10 INJECTION INTRAVENOUS at 12:40

## 2024-01-01 RX ADMIN — FENTANYL CITRATE 50 MCG: 50 INJECTION INTRAMUSCULAR; INTRAVENOUS at 13:35

## 2024-01-01 RX ADMIN — ENOXAPARIN SODIUM 80 MG: 80 INJECTION SUBCUTANEOUS at 22:03

## 2024-01-01 RX ADMIN — HEPARIN SODIUM AND DEXTROSE 1500 UNITS/HR: 10000; 5 INJECTION INTRAVENOUS at 15:19

## 2024-01-01 RX ADMIN — SODIUM CHLORIDE: 9 INJECTION, SOLUTION INTRAVENOUS at 17:12

## 2024-01-01 RX ADMIN — GADOBUTROL 7 ML: 604.72 INJECTION INTRAVENOUS at 16:07

## 2024-01-01 RX ADMIN — ONDANSETRON 4 MG: 2 INJECTION INTRAMUSCULAR; INTRAVENOUS at 11:56

## 2024-01-01 RX ADMIN — FENTANYL CITRATE 25 MCG: 50 INJECTION, SOLUTION INTRAMUSCULAR; INTRAVENOUS at 13:10

## 2024-01-01 RX ADMIN — INSULIN ASPART 1 UNITS: 100 INJECTION, SOLUTION INTRAVENOUS; SUBCUTANEOUS at 16:45

## 2024-01-01 RX ADMIN — LIDOCAINE HYDROCHLORIDE 5 ML: 10 INJECTION, SOLUTION EPIDURAL; INFILTRATION; INTRACAUDAL; PERINEURAL at 13:13

## 2024-01-01 RX ADMIN — AMOXICILLIN AND CLAVULANATE POTASSIUM 1 TABLET: 875; 125 TABLET, FILM COATED ORAL at 08:33

## 2024-01-01 RX ADMIN — PANTOPRAZOLE SODIUM 40 MG: 40 INJECTION, POWDER, FOR SOLUTION INTRAVENOUS at 08:24

## 2024-01-01 RX ADMIN — SODIUM CHLORIDE 500 ML: 9 INJECTION, SOLUTION INTRAVENOUS at 09:57

## 2024-01-01 RX ADMIN — Medication 5 ML: at 16:00

## 2024-01-01 RX ADMIN — MIDAZOLAM HYDROCHLORIDE 2 MG: 1 INJECTION, SOLUTION INTRAMUSCULAR; INTRAVENOUS at 12:58

## 2024-01-01 RX ADMIN — METHYLPREDNISOLONE 32 MG: 32 TABLET ORAL at 06:30

## 2024-01-01 RX ADMIN — Medication 5 ML: at 09:24

## 2024-01-01 RX ADMIN — Medication 500 UNITS: at 13:18

## 2024-01-01 RX ADMIN — PANCRELIPASE 2 CAPSULE: 60000; 12000; 38000 CAPSULE, DELAYED RELEASE PELLETS ORAL at 11:55

## 2024-01-01 RX ADMIN — SUGAMMADEX 150 MG: 100 INJECTION, SOLUTION INTRAVENOUS at 13:15

## 2024-01-01 RX ADMIN — CETIRIZINE HYDROCHLORIDE 10 MG: 10 TABLET, FILM COATED ORAL at 08:24

## 2024-01-01 RX ADMIN — SUCCINYLCHOLINE CHLORIDE 100 MG: 20 INJECTION, SOLUTION INTRAMUSCULAR; INTRAVENOUS; PARENTERAL at 11:50

## 2024-01-01 RX ADMIN — PANCRELIPASE 2 CAPSULE: 60000; 12000; 38000 CAPSULE, DELAYED RELEASE PELLETS ORAL at 17:09

## 2024-01-01 RX ADMIN — PHENYLEPHRINE HYDROCHLORIDE 100 MCG: 10 INJECTION INTRAVENOUS at 12:39

## 2024-01-01 RX ADMIN — FENTANYL CITRATE 50 MCG: 50 INJECTION INTRAMUSCULAR; INTRAVENOUS at 12:06

## 2024-01-01 RX ADMIN — PHENYLEPHRINE HYDROCHLORIDE 200 MCG: 10 INJECTION INTRAVENOUS at 12:08

## 2024-01-01 RX ADMIN — Medication 2 G: at 12:52

## 2024-01-01 RX ADMIN — PIPERACILLIN AND TAZOBACTAM 3.38 G: 3; .375 INJECTION, POWDER, FOR SOLUTION INTRAVENOUS at 16:45

## 2024-01-01 RX ADMIN — ACETAMINOPHEN 975 MG: 325 TABLET, FILM COATED ORAL at 10:16

## 2024-01-01 RX ADMIN — PROPOFOL 40 MG: 10 INJECTION, EMULSION INTRAVENOUS at 13:18

## 2024-01-01 RX ADMIN — FENTANYL CITRATE 50 MCG: 50 INJECTION INTRAMUSCULAR; INTRAVENOUS at 13:21

## 2024-01-01 RX ADMIN — PANCRELIPASE 2 CAPSULE: 60000; 12000; 38000 CAPSULE, DELAYED RELEASE PELLETS ORAL at 11:37

## 2024-01-01 RX ADMIN — PANTOPRAZOLE SODIUM 40 MG: 40 INJECTION, POWDER, FOR SOLUTION INTRAVENOUS at 09:03

## 2024-01-01 RX ADMIN — INSULIN ASPART 1 UNITS: 100 INJECTION, SOLUTION INTRAVENOUS; SUBCUTANEOUS at 17:10

## 2024-01-01 RX ADMIN — PIPERACILLIN AND TAZOBACTAM 3.38 G: 3; .375 INJECTION, POWDER, FOR SOLUTION INTRAVENOUS at 00:17

## 2024-01-01 RX ADMIN — EPHEDRINE SULFATE 10 MG: 5 INJECTION INTRAVENOUS at 12:20

## 2024-01-01 RX ADMIN — HEPARIN, PORCINE (PF) 10 UNIT/ML INTRAVENOUS SYRINGE 5 ML: at 12:23

## 2024-01-01 RX ADMIN — HEPARIN SODIUM AND DEXTROSE 2350 UNITS/HR: 10000; 5 INJECTION INTRAVENOUS at 01:10

## 2024-01-01 RX ADMIN — AMOXICILLIN AND CLAVULANATE POTASSIUM 1 TABLET: 875; 125 TABLET, FILM COATED ORAL at 20:09

## 2024-01-01 RX ADMIN — FENTANYL CITRATE 25 MCG: 50 INJECTION, SOLUTION INTRAMUSCULAR; INTRAVENOUS at 09:32

## 2024-01-01 RX ADMIN — PHENYLEPHRINE HYDROCHLORIDE 100 MCG: 10 INJECTION INTRAVENOUS at 11:50

## 2024-01-01 RX ADMIN — ENOXAPARIN SODIUM 80 MG: 80 INJECTION SUBCUTANEOUS at 11:02

## 2024-01-01 RX ADMIN — INSULIN ASPART 1 UNITS: 100 INJECTION, SOLUTION INTRAVENOUS; SUBCUTANEOUS at 11:36

## 2024-01-01 RX ADMIN — CETIRIZINE HYDROCHLORIDE 10 MG: 10 TABLET, FILM COATED ORAL at 08:04

## 2024-01-01 RX ADMIN — Medication 30 MG: at 12:02

## 2024-01-01 RX ADMIN — PHENYLEPHRINE HYDROCHLORIDE 100 MCG: 10 INJECTION INTRAVENOUS at 12:17

## 2024-01-01 RX ADMIN — LEVOFLOXACIN 500 MG: 5 INJECTION, SOLUTION INTRAVENOUS at 12:00

## 2024-01-01 RX ADMIN — PHENYLEPHRINE HYDROCHLORIDE 200 MCG: 10 INJECTION INTRAVENOUS at 12:28

## 2024-01-01 RX ADMIN — PIPERACILLIN AND TAZOBACTAM 3.38 G: 3; .375 INJECTION, POWDER, FOR SOLUTION INTRAVENOUS at 10:24

## 2024-01-01 RX ADMIN — SODIUM CHLORIDE: 9 INJECTION, SOLUTION INTRAVENOUS at 07:17

## 2024-01-01 RX ADMIN — PROPOFOL 100 MG: 10 INJECTION, EMULSION INTRAVENOUS at 11:50

## 2024-01-01 RX ADMIN — SODIUM CHLORIDE, POTASSIUM CHLORIDE, SODIUM LACTATE AND CALCIUM CHLORIDE: 600; 310; 30; 20 INJECTION, SOLUTION INTRAVENOUS at 11:38

## 2024-01-01 RX ADMIN — PHENYLEPHRINE HYDROCHLORIDE 400 MCG: 10 INJECTION INTRAVENOUS at 12:13

## 2024-01-01 ASSESSMENT — ACTIVITIES OF DAILY LIVING (ADL)
ADLS_ACUITY_SCORE: 37
ADLS_ACUITY_SCORE: 36
ADLS_ACUITY_SCORE: 39
ADLS_ACUITY_SCORE: 41
ADLS_ACUITY_SCORE: 33
ADLS_ACUITY_SCORE: 37
ADLS_ACUITY_SCORE: 37
ADLS_ACUITY_SCORE: 41
ADLS_ACUITY_SCORE: 33
ADLS_ACUITY_SCORE: 33
ADLS_ACUITY_SCORE: 44
ADLS_ACUITY_SCORE: 41
ADLS_ACUITY_SCORE: 37
ADLS_ACUITY_SCORE: 44
ADLS_ACUITY_SCORE: 39
ADLS_ACUITY_SCORE: 37
ADLS_ACUITY_SCORE: 36
ADLS_ACUITY_SCORE: 41
ADLS_ACUITY_SCORE: 33
ADLS_ACUITY_SCORE: 36
ADLS_ACUITY_SCORE: 44
ADLS_ACUITY_SCORE: 44
ADLS_ACUITY_SCORE: 33
ADLS_ACUITY_SCORE: 44
ADLS_ACUITY_SCORE: 36
ADLS_ACUITY_SCORE: 33
ADLS_ACUITY_SCORE: 41
ADLS_ACUITY_SCORE: 37
ADLS_ACUITY_SCORE: 41
ADLS_ACUITY_SCORE: 36
ADLS_ACUITY_SCORE: 37
ADLS_ACUITY_SCORE: 37
ADLS_ACUITY_SCORE: 41
ADLS_ACUITY_SCORE: 41
ADLS_ACUITY_SCORE: 33
ADLS_ACUITY_SCORE: 41
ADLS_ACUITY_SCORE: 44
ADLS_ACUITY_SCORE: 33
DEPENDENT_IADLS:: TRANSPORTATION;CLEANING;LAUNDRY
ADLS_ACUITY_SCORE: 37
ADLS_ACUITY_SCORE: 41
ADLS_ACUITY_SCORE: 33
ADLS_ACUITY_SCORE: 36
ADLS_ACUITY_SCORE: 37
ADLS_ACUITY_SCORE: 34
ADLS_ACUITY_SCORE: 36
ADLS_ACUITY_SCORE: 41
ADLS_ACUITY_SCORE: 41
ADLS_ACUITY_SCORE: 34
ADLS_ACUITY_SCORE: 41
ADLS_ACUITY_SCORE: 36
ADLS_ACUITY_SCORE: 41
ADLS_ACUITY_SCORE: 37
ADLS_ACUITY_SCORE: 36
ADLS_ACUITY_SCORE: 41
ADLS_ACUITY_SCORE: 41
ADLS_ACUITY_SCORE: 33
ADLS_ACUITY_SCORE: 41
ADLS_ACUITY_SCORE: 37
ADLS_ACUITY_SCORE: 34
ADLS_ACUITY_SCORE: 33
ADLS_ACUITY_SCORE: 41
ADLS_ACUITY_SCORE: 34
ADLS_ACUITY_SCORE: 41
ADLS_ACUITY_SCORE: 37
ADLS_ACUITY_SCORE: 41
ADLS_ACUITY_SCORE: 36
ADLS_ACUITY_SCORE: 41
ADLS_ACUITY_SCORE: 44
ADLS_ACUITY_SCORE: 41
ADLS_ACUITY_SCORE: 44
ADLS_ACUITY_SCORE: 37
ADLS_ACUITY_SCORE: 41
ADLS_ACUITY_SCORE: 39
ADLS_ACUITY_SCORE: 44
ADLS_ACUITY_SCORE: 37
ADLS_ACUITY_SCORE: 44
ADLS_ACUITY_SCORE: 44
ADLS_ACUITY_SCORE: 33
ADLS_ACUITY_SCORE: 41
ADLS_ACUITY_SCORE: 41
ADLS_ACUITY_SCORE: 39
ADLS_ACUITY_SCORE: 44
ADLS_ACUITY_SCORE: 41
ADLS_ACUITY_SCORE: 34
ADLS_ACUITY_SCORE: 39

## 2024-01-01 ASSESSMENT — PAIN SCALES - GENERAL
PAINLEVEL: NO PAIN (1)
PAINLEVEL: NO PAIN (1)
PAINLEVEL: NO PAIN (0)
PAINLEVEL: NO PAIN (1)

## 2024-01-01 ASSESSMENT — COLUMBIA-SUICIDE SEVERITY RATING SCALE - C-SSRS
1. IN THE PAST MONTH, HAVE YOU WISHED YOU WERE DEAD OR WISHED YOU COULD GO TO SLEEP AND NOT WAKE UP?: NO
2. HAVE YOU ACTUALLY HAD ANY THOUGHTS OF KILLING YOURSELF IN THE PAST MONTH?: NO
6. HAVE YOU EVER DONE ANYTHING, STARTED TO DO ANYTHING, OR PREPARED TO DO ANYTHING TO END YOUR LIFE?: NO

## 2024-03-04 NOTE — PROGRESS NOTES
New Patient Oncology Nurse Navigator Note     Referring provider:   Self Referral     Referred to (specialty): Medical Oncology    Requested provider (if applicable):   Dr. Johnson, Dr. Burgess or Dr. Cohn      Date Referral Received:   2/29/24     Evaluation for :   CrossMATCH [id number] clinical trial with Paclitaxel + Nilotinib on 11/24/23; transfer of care this month     Clinical History (per Nurse review of records provided):    Stage IV pancreatic adenocarcinoma with metastasis to liver, biopsy confirmed of liver lesion.     Per oncology notes 2/23/24:  Plan     1. Metastatic pancreatic cancer to the liver: in progression   - Currently enrolled in clinical trial [id number]  - C4D1 of Paclitaxel is due on 3/1/24  - Resume Nilotinib 300 mg PO BID on 2/24/24  - All medications in this regimen are given until disease progression or intolerance  - Patient requires intensive monitoring for toxicity secondary to cancer treatment  - Repeat  every 2 weeks   - Repeat amylase and lipase every 4 weeks   - Restaging imaging every 8 weeks/2 cycles. Next due prior to C5D1.   - In the event of progression, consider FOLFIRINOX  - Follow up with Palliative Care on 3/15/24  - RTC in 1 week for lab/OV/chemo    Patient was able to find a facility that will be able to continue this trial for him. He will complete C4 locally and then begin treatment in [place] starting with C5D1.    Patient had been in contact with Norah Ramirez, research nurse at [facility].  Per message from Norah: We do have the study that the patient is currently on.  He will be transferring to us this month.  He will need an appointment with one of the Oncologist here.  I advised the patient to scheduled with Dr. Johnson, Dr. Burgess or Dr. Cohn       Pertinent oncology notes, pathology/labs & imaging bookmarked**    Records Location (Care Everywhere, Media, etc.):   Records with [facility] ([place], [place]).      I contacted patient to discuss referral  to medical oncology for transfer of care of clinical trial.  It was indicated by Norah Ramirez, for pt to see oncology at [facility] anyone other than Dr. Siu.  We unfortunately only have options that fall in line when he will be here, with Dr. Siu.   I contacted leadership and they indicate he can be transferred to another oncologist when Dr. Siu leaves.  I let Jerel know our new patient scheduling will be reaching out to finalize appointment plan as soon as I see records are in process.  I reviewed this first appointment will be a consultation.    PLAN: Dr. Siu 3/25 @ [facility], 3-4 PM, NEW, in person    3/5/24  Jerel called today to let me know he had a CT scan today which is showing -    CT CHEST ABDOMEN PELVIS WO CONTRAST   COMPARISON   Impression: interval progression of disease. Several, ill-defined   subcentimeter nodules at both lung bases new since prior examination.   Increased lymphadenopathy in the mediastinum. Interval increase in size of   several hepatic metastases.        He believes he no longer qualifies for the trial if there is progression.  He has a message out to his current oncologist, but wants to get in sooner.  [place] [facility]  I moved the hold up at [facility], to 3/20, with Dr. Johnson, as there was a cancellation, but I also placed a hold for a video appt on 3/11, with Dr. Bravo.  Jerel will connect with his current oncologist to determine if he indeed is unable to go forward with the current trial and will likely see Dr. Bravo on 3/11, if that is the case.  Records are already in process.  Jerel will call me once he has connected with his current oncologist.  I will send a message to Norah Ramirez, RN, to update as well.    3/6/24  Jerel called me this morning to confirm his appointment.  He indicates they would likely have a 4 week wash out and plan to start a new trial or different treatment.  He would like to move forward with the plan for appointment on 3/11 with Dr. Bravo  that we discussed.  I gave him the contact numbers for new patient scheduling and clinic.  I let him know I had reached out to Norah and she will also get back to me after speaking with his research team.   He has no other questions at this time.  I warm transferred him to new patient scheduling to finalize 3/11, video appt.    3/8/24  It appears there was an error in scheduling and patient was set as an in person.  Appointment has since been corrected by new patient scheduling team.  Dr. Bravo did reach out to me to clarify this case.  We decided it was best for me to contact patient again to ensure we are all on the same page since this came through initially as a transfer of clinical trial and has now changed to a medical oncology consult for transfer of care.  I contacted Jerel.  He is in agreement, that he just wants Dr. Bravo to manage his oncology care going forward and plans to be seen for treatment going forward at the [facility].      Jerel was actually not aware of the change to video appointment.  I discussed this with him.  He would like an email link for this.  I had new patient scheduling update this in his chart.  I also emailed him the virtual coordinator phone number in case needed.  He has no other questions at this time and will plan to see Dr. Bravo, virtually, on 3/13 for further discussion of oncology care.    Meeta Metzger, RN, BSN  Oncology New Patient Nurse Navigator   [facility]  [phone]

## 2024-03-05 NOTE — TELEPHONE ENCOUNTER
RECORDS STATUS - ALL OTHER DIAGNOSIS      RECORDS RECEIVED FROM: [facility]   DATE RECEIVED:    NOTES STATUS DETAILS   OFFICE NOTE from medical oncologist [facility] Billie Arriaga: 3/1/24    Dr. Reynold Sheikh: 23   DISCHARGE SUMMARY from hospital CE - [facility] 24, 24   DISCHARGE REPORT from the ER CE - [facility] 24, 8/15/23, 22, 22, 10/31/11   OPERATIVE REPORT [facility] 23: Insertion Tunneled Central Venous    18, 08: Colonoscopy   MEDICATION LIST [facility]   CLINICAL TRIAL TREATMENTS TO DATE CE [facility] 24: C5D1    LABS     PATHOLOGY REPORTS [facility], Reports in CE, slides requested 3/6  [employer] [facility] 23: [id number]   ANYTHING RELATED TO DIAGNOSIS Epic/CE 3/1/24   GENONOMIC TESTING     TYPE: CE - [facility] 23: [facility]   IMAGING (NEED IMAGES & REPORT)  [employer] [facility]   CT SCANS Requested 3/5 3/5/24, 24, 23, 23, 8/15/23: [facility]   MRI Requested 3/5 8/15/23: [facility]   ULTRASOUND Requested 3/5 8/15/23: [facility]

## 2024-03-06 NOTE — TELEPHONE ENCOUNTER
Imaging DISC Received  March 6, 2024 3:27 PM    Action: Imaging disc from [facility] received and taken to [facility] in 4N for upload.      Action March 7, 2024 1:25 PM    Action Taken Slides from [facility] received and taken to 5th floor path lab for review.  8/28/23: [id number]  (2 slides)
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
## 2024-03-11 PROBLEM — C25.8 OVERLAPPING MALIGNANT NEOPLASM OF PANCREAS (H): Status: ACTIVE | Noted: 2024-01-01
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
## 2024-03-11 NOTE — NURSING NOTE
"Patient is also taking- Vitamin D2 2 at each meal, Vitamin E 180 MG once daily, Calcium 600 Mg once daily, Glucosin mine, Zyrtec 10 Mg once daily, Magic Mouth wash as needed, Prilosec as needed, Anti acid as needed, Tramadol, Zofran, Oxycodone, Compazine, Tylenol.        Is the patient currently in the state of [place]? YES    Visit mode:VIDEO    If the visit is dropped, the patient can be reconnected by: VIDEO VISIT: Send to e-mail at: [email or web address]    Will anyone else be joining the visit? [email or web address]  (If patient encounters technical issues they should call [phone]    How would you like to obtain your AVS? MyChart    Are changes needed to the allergy or medication list?  Vitamin D2 2 at each meal, Vitamin E 180 MG once daily, Calcium 600 Mg once daily, Glucosin mine, Zyrtec 10 Mg once daily, Magic Mouth wash as needed, Prilosec as needed, Anti acid as needed, Tramadol, Zofran, Oxycodone, Compazine, Tylenol.       Reason for visit: Consult (Patient said, \"What is the best treatment plan going forward to buy time and have a decent quality of life, picline needs to be changed once weekly last changed on Friday March 8th, 2024.\" )      Brook Morris VVF     "

## 2024-03-12 NOTE — PROGRESS NOTES
Interventional Radiology - Pre-Procedure Note:  [facility]  03/13/2024     Procedure Requested: Port placement  Requested by: Jimmy Bravo MD     History and Physical Reviewed: H&P documented within 30 days (by Yulia GRAJEDA on 03/08/2024).  I have personally reviewed the patient's medical history and have updated the medical record as necessary.    Brief HPI: Obi Lawrence is a 68 year old male with a PMH of DVT on lovenox and metastatic pancreatic cancer who presents for a port placement. Treatment plan includes chemotherapy. No plans for radiation. Requesting port placement.    Previously had RIGHT sided port in place, but this became infected, requiring removal on 01/14/2024. RIGHT PICC placed during that admission for IV abx, now completed. Patient also requesting RIGHT PICC removal after placement today.    Noted contrast allergy. Contrast is not typically used for port placements, however, patient will need to be premedicated if it is required.    IMAGING:  CT Chest Abdomen Pelvis w/o Contrast 03/05/2024  CT CHEST ABDOMEN PELVIS WO CONTRAST   COMPARISON: CT chest abdomen pelvis 01/23/2024   HISTORY: Malignant neoplasm of tail of pancreas (*) [C25.2 (ICD-10-CM)];   Metastasis to liver (*) [C78.7 (ICD-10-CM)]   TECHNIQUE: CT of the chest, abdomen, and pelvis was performed without IV   contrast. This limits evaluation of the viscera. Automatic exposure control   was used.3-D MIP images of the chest performed with post processing at CT   console.     FINDINGS:   CHEST:   Lungs: Several, ill-defined nodules at both lung bases measuring up to 6 mm   in short axis, new since prior examination.   Lymph nodes: Several enlarged lymph nodes in the prevascular space   measuring up to 14 mm, increased from 10 mm on prior study.   Vasculature: Normal.   Mediastinum: Normal.   Bones: Normal.     ABDOMEN/PELVIS:   Liver: Multiple, ill-defined hypodense lesions again seen.  "The dominant   lesion in the superior left lobe measures approximately 6.1 x 5.9 cm   compared to 3.7 x 3.9 cm when remeasured on the prior study. A lesion in   the periphery of the right lobe measures 3.8 x 3 cm compared to 2.9 x 2.3   cm on prior.   Spleen: Normal.   Pancreas: Stable.   Adrenal glands: Normal.   Gallbladder: Normal.   Kidneys: Normal.   Vasculature: Normal.   Lymph nodes: Normal.   Bowel: Normal.   Pelvis: Small amount of fluid in the pelvic peritoneal cavity.   Bones: Normal.     Impression    Interval progression of disease. Several, ill-defined  subcentimeter nodules at both lung bases new since prior examination.  Increased lymphadenopathy in the mediastinum. Interval increase in size of  several hepatic metastases.        NPO: Midnight  ANTICOAGULANTS: Lovenox, no hold required  ANTIBIOTICS: Ancef 2 g    ALLERGIES  Allergies   Allergen Reactions    Iodinated Contrast Media Swelling     Developed angioedema and urticaria approximately 12 to 24 hours after undergoing CT with IV contrast.  May also have been related to the use of nonsteroidal anti-inflammatories.  See ER visits 11/1 and 11/2/2022         LABS:  No results found for: \"INR\"   No results found for: \"HGB\"  No results found for: \"PLT\"  No results found for: \"CR\"  No results found for: \"POTASSIUM\"      EXAM:  /72 (BP Location: Left arm)   Pulse 63   Temp 97.6  F (36.4  C) (Oral)   Resp 18   SpO2 98%   General: Stable. In no acute distress.    Neuro: Alert and oriented x 3. No focal deficits.  Psych: Appropriate mood and affect. Linear/coherent thought process.   Resp: Normal respirations. Lungs clear to auscultation bilaterally.  Cardio: S1S2, regular rate and rhythm, without murmur, clicks or rubs.  Skin: Warm and dry. Without excoriations, ecchymosis, erythema, lesions or open sores. Old RIGHT port site well healed, no erythema/swelling/drainage noted. RIGHT PICC CDI.      Pre-Sedation Assessment:  Mallampati Airway " Classification:  II - Faucial pillars and soft palate may be seen, but uvula is masked by the base of the tongue  Previous reaction to anesthesia/sedation:  No  Sedation plan based on assessment: Moderate (conscious) sedation  ASA Classification: Class 3 - SEVERE SYSTEMIC DISEASE, DEFINITE FUNCTIONAL LIMITATIONS.   Code Status: FULL CODE      ASSESSMENT/PLAN:   Port placement with sedation, laterality per proceduralist. No contraindications to RIGHT sided placement.    Procedural education reviewed with patient/family in detail including, but not limited to risks, benefits and alternatives with understanding verbalized by patient/family.    Total time spent on the date of the encounter: 30 minutes.      RADHA STEELE CNP  Interventional Radiology

## 2024-03-13 NOTE — DISCHARGE INSTRUCTIONS
Port Placement Procedure Discharge Instructions:  You had a port placed. A port is a small medical device that is placed under the skin and is connected to a vein with a catheter (thin, flexible tube). Ports can be used to administer IV medications (including chemotherapy), fluids or blood products or for blood lab draws. Please follow the below instructions after your procedure:    Care Instructions:  - If you received sedation for your procedure, do not drive or operate heavy machinery for the rest of the day.  - You may shower beginning tomorrow (post procedure day #1). Do not scrub site until well healed; pat dry gently with a towel.  - You likely have skin adhesive over your port site. Skin adhesive works like a bandage to keep the site covered and protected. Do not use antibiotic ointment or creams/lotions over adhesive as it can break it down. The skin adhesive will peel off on its own (typically in 5-14 days).  - Avoid submerging the port site under water (ex: tub baths, Jacuzzis, lakes, hot tubs and pools) for 10 days or until your site is well healed.  - You may have some discomfort, minimal swelling, redness and/or bruising at your port site/procedure site. You may take over the counter pain medication for discomfort (follow the package directions) or apply an ice pack wrapped in a towel over the site (rotating 20 minutes with ice pack on and 20 minutes with ice pack off) for comfort as needed. It can take several days for these to resolve.  - Avoid heavy lifting (greater than 10 pounds) and strenuous activities for 2 days following your procedure.   - If you experience significant bleeding at site, apply pressure with hands above the clavicle bone, sit upright and seek immediate medical assistance.  - Ports need to be flushed approximately every 4-6 weeks, if not being used more frequently. Follow up with the provider who ordered your port placement for further instructions for this.    Seek medical  evaluation or contact Cathie BARNES RN Line at [phone] if you experience the following:  - Uncontrolled bleeding from port site  - Fever (greater than 101 F (38.3C))  - Purulent (yellow/green/foul smelling) drainage from port insertion site  - Increasing pain at port site  - Increasing redness at port site

## 2024-03-13 NOTE — PROCEDURES
[facility]    Procedure: IR Procedure Note    Date/Time: 3/13/2024 1:28 PM    Performed by: Tenzin Miranda MD  Authorized by: Tenzin Miranda MD      UNIVERSAL PROTOCOL   Site Marked: NA  Prior Images Obtained and Reviewed:  Yes  Required items: Required blood products, implants, devices and special equipment available    Patient identity confirmed:  Verbally with patient, arm band, provided demographic data and hospital-assigned identification number  Patient was reevaluated immediately before administering moderate or deep sedation or anesthesia  Confirmation Checklist:  Patient's identity using two indicators, relevant allergies, procedure was appropriate and matched the consent or emergent situation and correct equipment/implants were available  Time out: Immediately prior to the procedure a time out was called    Universal Protocol: the Joint Commission Universal Protocol was followed    Preparation: Patient was prepped and draped in usual sterile fashion       ANESTHESIA    Anesthesia:  Local infiltration  Local Anesthetic:  Lidocaine 1% without epinephrine      SEDATION  Patient Sedated: Yes    Sedation Type:  Moderate (conscious) sedation  Vital signs: Vital signs monitored during sedation    See dictated procedure note for full details.  Findings: Tolerated well    Specimens: none    Complications: None    Condition: Stable    Plan: Port ready for immediate use.    Anticipate discharge to home today      PROCEDURE  Describe Procedure: Left chest port placed, no complication.  Tip at cavoatrial junction.  Patient Tolerance:  Patient tolerated the procedure well with no immediate complications  Length of time physician/provider present for 1:1 monitoring during sedation: 25

## 2024-03-13 NOTE — PRE-PROCEDURE
GENERAL PRE-PROCEDURE:   Procedure:  Port placement  Date/Time:  3/13/2024 12:03 PM    Written consent obtained?: Yes    Risks and benefits: Risks, benefits and alternatives were discussed    Consent given by:  Patient  Patient states understanding of procedure being performed: Yes    Patient's understanding of procedure matches consent: Yes    Procedure consent matches procedure scheduled: Yes    Expected level of sedation:  Moderate  Appropriately NPO:  Yes  ASA Class:  3  Mallampati  :  Grade 2- soft palate, base of uvula, tonsillar pillars, and portion of posterior pharyngeal wall visible  Lungs:  Lungs clear with good breath sounds bilaterally  Heart:  Normal heart sounds and rate  History & Physical reviewed:  History and physical reviewed and no updates needed  Statement of review:  I have reviewed the lab findings, diagnostic data, medications, and the plan for sedation

## 2024-03-20 NOTE — PROGRESS NOTES
Therapy: 5fu  Insurance: [facility] Medicare Adv plan     Misc: [facility] is OON with this pt's plan. [facility].    In reference to referral from [facility] clinic received on 03/18/24 to check for 5fu coverage.    Please contact Intake with any questions, [phone] or In Basket pool, [facility] [phone]).

## 2024-03-22 NOTE — TELEPHONE ENCOUNTER
Introduction [employer] sent to pt 3/11 along with Consent to Communicate form, this has not been returned. Scheduling has reached out to pt regarding infusion appointments and pt chose to start C1 at [facility] due to earlier availability 3/26.     Chemo education handouts sent via [email or web address] 3/22/2024 and writer will call for chemo teach before 3/26.

## 2024-03-22 NOTE — TELEPHONE ENCOUNTER
RN Care Coordination Note     OUTGOING CALL: spoke with patient via telephone call    Provided RNCC contact information, [facility] phone number (which has options to talk with a Nurse available 24/7 - triage and RNCC via this option during business hours).   Reviewed appropriate use of MyChart, not to be used for symptom reporting.   Reviewed [facility] care team members including midlevel providers in oncology dept and Dr. Bravo's usual clinic hours.    Chemo Teach  re: liposomal irinotecan and 5fu treatment plan   -See Pt Education documentation - Chemo Effects (Adult)  -Reviewed treatment schedule including cycle length, lab monitoring and prn medications.  -Verbal and written instruction provided using:   [facility] Drug Information sent via [employer] 3/22: reviewed Possible Side Effects, When to Contact Your Doctor of Health Care Provider and Self Care Tips sections.   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 All still present, pt has MMW, BSS, OTC prevention oncology mouth wash. Tried CBD based lotion on toes, was not effective. Using clear bandaging to keep toes compressed and that seems to help with neuropathy.    -Trial of Tasigna and Taxol discontinued 3/1    -Regular strength tylenol every night to be able to sleep due to back pain, agreeable to Palliative Care referral  -wonders if he can get back to [facility] from the Lovenox, was prescribed for DVTs and as far as he knows he did not have PE's, will discuss with Dr. Shelly [facility] Home infusion is out of network with pt's insurance so he will return to the clinic for pump disconnect. [facility] is much closer to him, will explore the option of having all  pump disconnect appointments at [facility] rather than [facility]    Follow-up visit:    C2 on 4/9, will meet with Barbara Montenegro PA-C prior to chemo  C3 on 4/23 with Barbara    Pt voiced understanding of above instructions and information and denied further questions
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
## 2024-03-25 NOTE — NURSING NOTE
Is the patient currently in the state of [place]? YES    Visit mode:VIDEO    If the visit is dropped, the patient can be reconnected by: VIDEO VISIT: Send to e-mail at: [email or web address]    Will anyone else be joining the visit?  LoraOtis's Wife   (If patient encounters technical issues they should call [phone]    How would you like to obtain your AVS? MyChart    Are changes needed to the allergy or medication list? No    Reason for visit: RECHECK (Bad cough and worse at night-having been using cough drops during the day and honey at night, cough keeps up for hours at night. )    Brook NELSON
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
## 2024-03-26 NOTE — TELEPHONE ENCOUNTER
[facility] calling to report they received prescription for loperamide and prochlorperazine but they have never filled prescriptions for pt in the past. Pharmacy tech looking to confirm provider meant to send prescriptions to [place] [facility] pharmacy. If sent to appropriate pharmacy, pt will need to call pharmacy or bring insurance card with them when they  prescription.     Call to pt to confirm he did want prescriptions sent to this pharmacy. Pt states he is currently in [place] waiting for MRI and was going to pick medications up after. However, he would prefer to have them sent to [facility] pharmacy.    Informed pt writer will send request to provider to have prescriptions sent to [facility] pharmacy. Pt verbalized understanding.

## 2024-03-26 NOTE — TELEPHONE ENCOUNTER
I called Jerel in follow-up of his MRCP done urgently today. MRCP was done to work-up bilirubin elevation from 2-->6 over the past few weeks as well as increase in ALP and significant leukocytosis with neutrophilia 7->21.     MRCP showed no biliary dilation or evidence of biliary obstruction but his gallbladder is diffusely thickened.     He noticed an increase in RUQ pain over the past 1.5 weeks that had been better the last two nights. He has noticed dark urine the past 2 days. Has not noticed he is jaundiced despite the infusion nurse noticing that today.     No fevers/chills.     I reviewed plan to talk to surgery team about undergoing an abdominal US versus intervention now. He is aware to present to ED with any fevers/chills or worsening RUQ pain.     His wife asked some good questions about chemo timeline and next steps. None of these I know until we have a better idea of surgical recommendations.     Phone call: 10 minutes.       Barbara Montenegro PA-C       EXAM: MR ABDOMEN MRCP W/O and W CONTRAST  LOCATION: [facility]  DATE: 3/26/2024     INDICATION: acute rise in bilirubin and rise in LFTs along with leukocytosis. Please assess for biliary obstruction  COMPARISON: CT 3/23/2024  TECHNIQUE: Routine MR liver/pancreas protocol including axial and coronal MRCP sequences. 2D and 3D reconstruction performed by MR technologist including MIP reconstruction and slab cholangiograms. If performed with contrast, additional dynamic T1 post   IV contrast images.  CONTRAST: 7ml Gadavist      FINDINGS:      MRCP: Normal intra and extrahepatic bile ducts. No dilatation, stricture, or intra ductal stones. No masses. Normal pancreatic duct anatomy. The gallbladder is relatively contracted with diffuse circumferential wall thickening.     LIVER: Noncirrhotic morphology. No significant steatosis or iron deposition. Numerous (10-15) hypoenhancing metastases in both lobes. Index lesions as  follows:  -Segment 4, 8.5 x 7.1 cm (13/34), previously 7.6 x 5.7 cm.  -Segment 7, 3.8 x 2.8 cm (13/26), previously 3.3 x 2.6 cm.     PANCREAS: No evidence of mass or pancreatitis.     ADDITIONAL FINDINGS: No significant abnormality in the right kidney or adrenal glands. Splenomegaly, 14.4 cm craniocaudal. Tiny simple, nonenhancing left renal cyst, which does not require additional follow-up. No adenopathy. Trace ascites.                                                                      IMPRESSION:  1.  No biliary dilation or evidence of biliary obstruction.     2.  Diffuse gallbladder wall thickening, which could be secondary to nonfasting state or reactive edema in the setting of hepatic metastases. If there is right upper quadrant pain or concern for cholecystitis, recommend further evaluation with   ultrasound.     3.  Multifocal hepatic metastases with apparent size increase compared to 3/23/2024, likely due to technical/modality differences rather than true growth.

## 2024-03-27 NOTE — TELEPHONE ENCOUNTER
[facility]: Cancer Care                                                                                          Per Barbara Montenegro PA-C: recommend RUQ US and trial some oral antibiotics cipro + flagyl, sent to local [facility] pharmacy.    Pt preferred [facility] location and was warm transferred to imaging scheduling. Advised him to start antibiotics today and reviewed possible side effects of diarrhea, encouraged to push fluids and eat yogurt. Pt verbalized understanding of the above and denied any questions.    Signature:  Daysi Johnston RN

## 2024-03-28 NOTE — TELEPHONE ENCOUNTER
[facility]     Referring provider:  Barbara Montenegro PA-C     Referred to: [facility]     Provider Requested: none specified     Referral Received:  3/28/24     Records received:  EPIC    Abd US limited 3/28/24  IMPRESSION:  1.  Multifocal hepatic metastases.  2.  Mild intrahepatic bile duct dilation, likely due to enzo hepatis obstruction from the intrahepatic masses.  3.  No dilation extrahepatic bile duct.  4.  Abnormal contracted and thickened gallbladder without stones.    MRCP abd 3/26/24  IMPRESSION:  1.  No biliary dilation or evidence of biliary obstruction.     2.  Diffuse gallbladder wall thickening, which could be secondary to nonfasting state or reactive edema in the setting of hepatic metastases. If there is right upper quadrant pain or concern for cholecystitis, recommend further evaluation with   ultrasound.     3.  Multifocal hepatic metastases with apparent size increase compared to 3/23/2024, likely due to technical/modality differences rather than true growth   Latest Reference Range & Units 03/26/24 09:57   Alkaline Phosphatase 40 - 150 U/L 889 (H)   ALT 0 - 70 U/L 69   AST 0 - 45 U/L 77 (H)   Bilirubin Total <=1.2 mg/dL 6.4 (H)   Glucose 70 - 99 mg/dL 253 (H)       Latest Reference Range & Units 03/26/24 09:08   WBC 4.0 - 11.0 10e3/uL 21.2 (H)   Hemoglobin 13.3 - 17.7 g/dL 8.6 (L)   Hematocrit 40.0 - 53.0 % 27.2 (L)   Platelet Count 150 - 450 10e3/uL 148 (L)      CT CAP without contrast 3/23/24  IMPRESSION:  1.  Likely stable hepatic and presumed pulmonary metastatic disease compared to 03/05/2024.  2.  Stable mediastinal adenopathy compared to 03/05/2024.  3.  Increasing abdominal and pelvic ascite    2/16/24 lab  Cancer Antigen 19-9 >17,000.0 High       IR 3/8/24  LIVER, NEEDLE BIOPSY OF A MASS LESION:  Adenocarcinoma, moderately differentiated:   -Compatible with clinical impression of metastasis from a pancreatic primary   -The included non-tumor liver  parenchyma shows no evidence of underlying cirrhosis, steatosis, or other apparent morphologic abnormalities    Images received: PACS    Insurance Coverage: Mult[employer]    Evaluation for:    Overlapping malignant neoplasm of pancreas (H)   Thickening of wall of gallbladder     ERCP ordered for concern for biliary obstruction of CBD with thickened, contracted gallbladder. patient currently on antibiotics      Clinical History (per RN review):   Telephone note 3/27/24  Per recommendation from Dr. Grant and Barbara Montenegro PAMARCIA:     Called pt to let him know Abd ultrasound showed a thickened bladder, no stones. Could potentially be infection versus cancer in the gallbladder. Pt to remain on antibiotics and urgent GI consult for ERCP placed.     Writer will follow-up with pt to make sure ERCP is scheduled by tomorrow. Pt asked for refill of lovenox, stated he has enough for 10 days. Teed up to Barbara for approval        Office visit with referring provider 3/25/24  ASSESSMENT/PLAN:  Mr. Lawrence is a 68 yr old gentleman diagnosed with pancreatic adenocarcinoma in summer 2023, metastatic at the time of diagnosis to the liver. Following disease progression after two months of treatment using gemcitabine + nab-paclitaxel in the first-line setting, he was treated on a COMBO-MATCH arm comprising paclitaxel with nilotinib.Treatment was interrupted in January 2024 due to sepsis stemming from port infection. A CT scan at his local/treating clinic in early March 2024 reported substantial progression of disease including progression of the established hepatic metastases, and new presumed metastases to the lungs and also mediastinum. For that reason, he had to come off treatment on the trial. His  was >17,000 at baseline. Genomic profiling performed on the biopsy specimen did not reveal currently actionable targets; highlights include ISAURA finding, and KRAS G12D isoform.     He met with Dr. Bravo for a consult after moving to the  [facility] and he recommended next line treatment of liposomal irinotecan and 5FU. He had port a cath placed on 3/13. Plan to start C1 tomorrow at [facility]. Reviewed common side effects including fatigue, myelosuppression, nausea/vomiting, bowel changes like diarrhea. This regimen should not worsen his neuropathy.      For his post prandial bloating I recommended trying to increase Creon to 2 capsules with meals. He gets free drug through [employer].      For his recurrent DVT, since he had new DVT start on DoAc (Eliquis) I would not recommend changing back to that. He should stay on treatment dose of Lovenox.      For his cough, his recent CT chest shows no concern for pneumonitis or infection. I would recommend treating his post nasal drip/congestion with routine flonase and antihistamine daily as well as continue to use cough drops.      Continue pain control with pain regimen established by last clinic. He did not need any refills today.      Unless interval concerns arise, I will see him prior to cycle 2.       MD review date:   MD Decision for clinic consultation/Orders:            Referral updates/Patient contacted:

## 2024-03-28 NOTE — TELEPHONE ENCOUNTER
Per recommendation from Dr. Grant and Barbara Montenegro PA-C:    Called pt to let him know Abd ultrasound showed a thickened bladder, no stones. Could potentially be infection versus cancer in the gallbladder. Pt to remain on antibiotics and urgent GI consult for ERCP placed.    Writer will follow-up with pt to make sure ERCP is scheduled by tomorrow. Pt asked for refill of lovenox, stated he has enough for 10 days. Teed up to Barbara for approval.

## 2024-03-28 NOTE — TELEPHONE ENCOUNTER
Called pt to discuss referral and procedure recommendations    Procedure/Imaging/Clinic: ERCP   Physician: Felix   Timing: ASAP   Scope time needed: 45 min   Anesthesia: Gen   Dx: malignant biliary obstruction   Tier: 2   Location: [facility] OR   Header of letter for pt communication: ERCP     Discussed date of April 2nd with patient, in agreement.     Explained they can expect a call from  for date of procedure, OR will call 1-2 days prior to procedure date with arrival time, will need a , someone to stay with them for 24 hours and should stay in town for 24 hours (within 45 min of Hospital) post procedure    Patient needs to get pre-op physical completed. If outside [facility] system will need physical faxed to number [phone]     If you do not get a preop physical, your procedure could be cancelled, patient voiced understanding*    Preop Plan: 3/8/24 office visit with Yulia Millan at [facility] to be utilized. January 19th EKG  Performed    Does patient have any history of gastric bypass/gastric surgery/altered panc/bili anatomy? none    Any recent Covid symptoms or positive covid test? none    Does patient have [employer] insurance?: [employer] insurance group, will change to [employer] plan on April 1st, confirmation of enrollment #[id number]    Med Review    Blood thinner -  lovenox, daily at 3pm, advised this be held on Monday April 1st  ASA - none  Diabetic - none  Any meds by injection or mouth for weight loss or diabetes- none    Patient Education r/t procedure: mychart    A pre-op nurse will call 1-2 days prior to the procedure.    NPO/Prep:   Adults and Children of all ages may consume solids up to 8 hours prior to arrival time - may consume clear liquids up to 1 hour prior to arrival time.    Will message referring provider as update, pt is new to [place] and only established care within [facility] in the last month.     Verbalized understanding of all instructions. All questions  answered.     Procedure order placed, message routed to OR      Coni Sandhu, RN, BSN,   [facility]  Care coordinator

## 2024-04-01 NOTE — ANESTHESIA PREPROCEDURE EVALUATION
"Anesthesia Pre-Procedure Evaluation    Patient: Obi Lawrence   MRN: [id number] : 1955        Procedure : Procedure(s):  ENDOSCOPIC RETROGRADE CHOLANGIOPANCREATOGRAPHY          History reviewed. No pertinent past medical history.   Past Surgical History:   Procedure Laterality Date    IR CHEST PORT PLACEMENT > 5 YRS OF AGE  3/13/2024      Allergies   Allergen Reactions    Iodinated Contrast Media Swelling     Developed angioedema and urticaria approximately 12 to 24 hours after undergoing CT with IV contrast.  May also have been related to the use of nonsteroidal anti-inflammatories.  See ER visits  and 2022      Social History     Tobacco Use    Smoking status: Never     Passive exposure: Past (Growing up both parents, old work lunch room)    Smokeless tobacco: Never   Substance Use Topics    Alcohol use: Not Currently     Comment: none since July      Wt Readings from Last 1 Encounters:   24 75.2 kg (165 lb 12.8 oz)        Anesthesia Evaluation            ROS/MED HX  ENT/Pulmonary:       Neurologic:       Cardiovascular:       METS/Exercise Tolerance:     Hematologic:     (+) History of blood clots,               Musculoskeletal:       GI/Hepatic:       Renal/Genitourinary:       Endo:       Psychiatric/Substance Use:       Infectious Disease:       Malignancy:   (+) Malignancy, History of GI.    Other:               OUTSIDE LABS:  CBC:   Lab Results   Component Value Date    WBC 21.2 (H) 2024    HGB 8.6 (L) 2024    HCT 27.2 (L) 2024     (L) 2024     BMP:   Lab Results   Component Value Date     (L) 2024    POTASSIUM 3.6 2024    CHLORIDE 93 (L) 2024    CO2 22 2024    BUN 14.2 2024    CR 0.54 (L) 2024     (H) 2024     COAGS: No results found for: \"PTT\", \"INR\", \"FIBR\"  POC: No results found for: \"BGM\", \"HCG\", \"HCGS\"  HEPATIC:   Lab Results   Component Value Date    ALBUMIN 2.5 (L) 2024    PROTTOTAL " 5.7 (L) 03/26/2024    ALT 69 03/26/2024    AST 77 (H) 03/26/2024    ALKPHOS 889 (H) 03/26/2024    BILITOTAL 6.4 (H) 03/26/2024     OTHER:   Lab Results   Component Value Date    TREVOR 8.3 (L) 03/26/2024       Anesthesia Plan    ASA Status:  3       Anesthesia Type: General.     - Airway: ETT   Induction: Intravenous, Propofol.   Maintenance: Balanced.   Techniques and Equipment:     - Lines/Monitors: BIS     Consents            Postoperative Care    Pain management: IV analgesics, Oral pain medications, Multi-modal analgesia.   PONV prophylaxis: Ondansetron (or other 5HT-3), Dexamethasone or Solumedrol     Comments:               Joel De La Vega MD    I have reviewed the pertinent notes and labs in the chart from the past 30 days and (re)examined the patient.  Any updates or changes from those notes are reflected in this note.     # Hyponatremia: Lowest Na = 125 mmol/L in last 30 days, will monitor as appropriate      # Hypoalbuminemia: Lowest albumin = 2.5 g/dL in the past 30 days , will monitor as appropriate

## 2024-04-02 NOTE — ANESTHESIA POSTPROCEDURE EVALUATION
Patient: Obi Lawrence    Procedure: Procedure(s):  ENDOSCOPIC RETROGRADE CHOLANGIOPANCREATOGRAPHY with biliary sphincterotomy, dilation, and stents placement       Anesthesia Type:  General    Note:  Disposition: Outpatient   Postop Pain Control: Uneventful            Sign Out: Well controlled pain   PONV: No   Neuro/Psych: Uneventful            Sign Out: Acceptable/Baseline neuro status   Airway/Respiratory: Uneventful            Sign Out: Acceptable/Baseline resp. status   CV/Hemodynamics: Uneventful            Sign Out: Acceptable CV status; No obvious hypovolemia; No obvious fluid overload   Other NRE: NONE   DID A NON-ROUTINE EVENT OCCUR? No           Last vitals:  Vitals Value Taken Time   BP 99/68 04/02/24 1400   Temp 36.7  C (98  F) 04/02/24 1350   Pulse 81 04/02/24 1411   Resp 30 04/02/24 1411   SpO2 97 % 04/02/24 1411   Vitals shown include unfiled device data.    Electronically Signed By: Kimberly Conrad MD  April 2, 2024  2:11 PM

## 2024-04-02 NOTE — ANESTHESIA PROCEDURE NOTES
Airway       Patient location during procedure: OR       Procedure Start/Stop Times: 4/2/2024 11:53 AM  Staff -        Anesthesiologist:  Joel De La Vega MD       CRNA: Gilles Garcia APRN CRNA       Performed By: anesthesiologist  Consent for Airway        Urgency: elective  Indications and Patient Condition       Indications for airway management: roxana-procedural       Induction type:intravenous       Mask difficulty assessment: 1 - vent by mask    Final Airway Details       Final airway type: endotracheal airway       Successful airway: ETT - single  Endotracheal Airway Details        ETT size (mm): 7.5       Successful intubation technique: direct laryngoscopy       DL Blade Type: Logan 2       Grade View of Cords: 1       Adjucts: stylet       Position: Right       Measured from: lips       Secured at (cm): 22       Bite block used: Molar    Post intubation assessment        Placement verified by: capnometry, equal breath sounds and chest rise        Number of attempts at approach: 2       Number of other approaches attempted: 0       Secured with: tape       Ease of procedure: easy       Dentition: Unchanged    Medication(s) Administered   Medication Administration Time: 4/2/2024 11:53 AM

## 2024-04-02 NOTE — DISCHARGE INSTRUCTIONS
Contacting your Doctor -   To contact a doctor, call [facility] at [phone] or:  [phone] and ask for the resident on call for Gastroenterology (answered 24 hours a day)   Emergency Department:  [facility]: [phone] 911 if you are in need of immediate or emergent help

## 2024-04-02 NOTE — ANESTHESIA CARE TRANSFER NOTE
Patient: Obi Lawrence    Procedure: Procedure(s):  ENDOSCOPIC RETROGRADE CHOLANGIOPANCREATOGRAPHY with biliary sphincterotomy, dilation, and stents placement       Diagnosis: Malignant biliary obstruction (H) [K83.1, C80.1]  Diagnosis Additional Information: No value filed.    Anesthesia Type:   General     Note:    Oropharynx: oropharynx clear of all foreign objects  Level of Consciousness: awake  Oxygen Supplementation: face mask  Level of Supplemental Oxygen (L/min / FiO2): 6  Independent Airway: airway patency satisfactory and stable  Dentition: dentition unchanged  Vital Signs Stable: post-procedure vital signs reviewed and stable  Report to RN Given: handoff report given  Patient transferred to: PACU    Handoff Report: Identifed the Patient, Identified the Reponsible Provider, Reviewed the pertinent medical history, Discussed the surgical course, Reviewed Intra-OP anesthesia mangement and issues during anesthesia, Set expectations for post-procedure period and Allowed opportunity for questions and acknowledgement of understanding      Vitals:  Vitals Value Taken Time   BP     Temp     Pulse 90 04/02/24 1354   Resp 31 04/02/24 1354   SpO2 100 % 04/02/24 1354   Vitals shown include unfiled device data.    Electronically Signed By: RADHA Washington CRNA  April 2, 2024  1:55 PM

## 2024-04-03 NOTE — PROGRESS NOTES
"Virtual Visit Details    Type of service:  Video Visit   Video Start Time: 10:17 AM  Video End Time: 1111    Originating Location (pt. Location): Home    Distant Location (provider location):  On-site  Platform used for Video Visit: [facility]    Palliative Care Outpatient Clinic Consultation Note    Patient:  Obi Lawrence    Chief Complaint:   Obi Lawrence 68 year old male who is presenting to the palliative medicine clinic today at the request of Barbara Montenegro for a palliative care consultation secondary to metastatic pancreatic cancer.   The patient's primary care provider is:  No Ref-Primary, Physician. He is joined by his wife, daughter, and a grand daughter    History of Present Illness:  This is from Barbara Montenegro's last note:    \"Date of encounter: Mar 25, 2024     Cancer diagnosis: metastatic/stage IV form of pancreatic adenocarcinoma with liver metastasis at the time of diagnosis; as of March 2024, sites of metastasis include liver, lungs, mediastinum.     Treatment to date: first-line palliative intent chemotherapy with cycle 1/day 1 gemcitabine with nab-paclitaxel initiated on September 7 2023; progression of disease noted after first two months of this first-line treatment, then patient was enrolled on the following clinical trial:: 11/9/2023 -  Clinical Trial    Enrolled in Study as 2nd-line therapy: [id number]  Molecular Analysis for Combination Therapy Choice (Freebeepay)      11/24/2023 - 3/1/2024 Chemotherapy    OP CLINICAL TRIAL [id number] PANCREATIC PACLITAXEL + NILOTINIB (Regimen 1)  D1/C1 Date: 11/24/2023  Plan Provider: David COSME MD  Treatment Goal: Palliative  Line of Treatment: Second Line     Progression of disease [facility]     3/26/24: Plan for third line 5FU and liposomal irinotecan         Tumor genomic profiling: reported via [employer], Sept 2023: no MSI-H; + KRAS G12D, ARID1A, TP53 missense variant, CDKN2A frameshift " mutation  See scanned report via Care Everywhere        History of Present Illness/Cancer Diagnosis and Evaluation to date:  Adopted from Dr. Bravo's consult: He was diagnosed with pancreatic cancer in summer 2023, while living in [place]. He had evaluation throughout that time, including initial findings that included a lower extremity DVT, as follows:     July 2023 lower leg ultrasound:      1. Positive DVT scan with occlusive thrombus within right calf peroneal and   gastrocnemius veins as above.      He had extensive work up in mid August 2023, for evaluation of the pancreatic tail mass and liver lesions, as follows:     August 15, 2023--CT a/p--1. Suspected pancreatic tail mass concerning for neoplasm. Recommend MRI   abdomen with and without contrast.   2. New low-density lesions throughout the liver worrisome for metastatic   disease.   3. Prominent roxana-aortic lymph nodes there is concern for possible   metastatic disease.   4. Left lower lobe 7 mm pulmonary nodule. Recommend follow-up. Metastatic   disease is not excluded.      August 15, 2023---MRI abdomen Impression     1. Reference 3 cm lesion in the pancreatic tail concerning for malignancy.  2. Developing bilateral lobar liver lesions concerning for metastatic  disease.         With strong suspicion for pancreatic adenocarcinoma, he underwent a liver biopsy as follows:  August 28, 2023--CT guided liver biopsy 1. Successful CT-guided biopsy of right hepatic lobe lesion.   2. Successful Gelfoam embolization of the biopsy tract.      Final Diagnosis     Liver; CT-guided biopsy:  -- Involved by moderately differentiated adenocarcinoma.      Microscopic Description     The microscopic findings support the diagnosis.     COMMENT:  Given the radiologic findings of pancreatic mass, the histopathologic findings in this liver biopsy are compatible with pancreaticobiliary primary.     Dr. Tenzin Ribeiro has reviewed pertinent slides of this case and concurs with the  "diagnosis.      Thus, with a histopathologic diagnosis of pancreatic adenocarcinoma, metastatic/stage IV due to involvement of liver at the time of diagnosis, he underwent further evaluation and had a high  biomarker at time of diagnosis, and initiated first-line palliative intent chemotherapy with gemcitabine and nab paclitaxel regimen initiated on or around September 7, 2023.  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     9/1/23--CT fibula: Impression     Possible acute DVT of the posterior tibial veins.\"    Plan is to start 5FU and liposomal irinotecan once his LFTs normalize.      Distressing Symptom/s: pain under right ribs worsened before his Biliary stent placement yesterday; he is reluctant to use meds that might worsen his liver function;     Patient's Disease Understanding: 'my  treatment is to get me more time to be with my family and I know I'll die from this at the end.'  Jerel also has gotten the notion he will have progressively worsening pain as he approaches the end of life.  We discussed how we will work to manage his pain.    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    Social History     Tobacco Use    Smoking status: Never     Passive exposure: Past (Growing up both parents, old work lunch room)    Smokeless tobacco: Never   Substance Use Topics    Alcohol use: Not Currently     Comment: none since July    Drug use: Never       Family History  No family history on file.    Advance Care Planning:  Advance Directive:    completed and given to the clinic yesterday for uploading  Where is written copy located: will be in [facility]  Health Care Agent Contact Information: wife and only daughter  POLST:   n/a  CODE STATUS: FULL CODE unless has an illness he is dying from    Allergies   Allergen Reactions    Iodinated Contrast Media Swelling     Developed angioedema and urticaria approximately 12 to 24 hours after undergoing CT with IV contrast.  May also have been related to the use of nonsteroidal anti-inflammatories.  See ER visits 11/1 and 11/2/2022     Current Outpatient Medications   Medication Sig Dispense Refill    Ascorbic Acid 500 MG/5ML LIQD Take 500 mg by mouth every 48 hours      cetirizine (ZYRTEC) 10 MG tablet Take 10 mg by mouth daily      ciprofloxacin (CIPRO) 500 MG tablet Take 1 tablet (500 mg) by mouth 2 times daily for 7 days 14 tablet 0    enoxaparin ANTICOAGULANT (LOVENOX) 120 MG/0.8ML syringe Inject 120 mg Subcutaneous      glucosamine 500 MG CAPS capsule Take 500 mg by mouth daily      lipase-protease-amylase (CREON 36) 10523-896063-777297 units CPEP Take 2 capsules by mouth 3 times daily (with meals) for 90 days 540 capsule 0    loperamide (IMODIUM) 2 MG capsule 2 caps  at 1st sign of diarrhea & 1 cap every 2hrs until 12hrs diarrhea free. During night, 2 caps at bedtime & 2 caps every 4hrs until AM 30 capsule 0    Oyster Shell (OYSTER CALCIUM PO) Take 1 tablet by mouth daily Total 1000mg      prochlorperazine (COMPAZINE) 10 MG tablet Take 0.5 tablets (5 mg) by mouth every 6 hours as needed for nausea or vomiting 30 tablet 2    Vitamin D, Cholecalciferol, 25 MCG (1000 UT) CAPS Take 2 capsules by mouth 3 times daily      vitamin E (TOCOPHEROL) 400 units (180 mg) capsule Take 180 capsules by mouth daily       No past medical history on file.  Past Surgical History:   Procedure Laterality Date    ENDOSCOPIC RETROGRADE CHOLANGIOPANCREATOGRAM N/A 4/2/2024    Procedure: ENDOSCOPIC RETROGRADE CHOLANGIOPANCREATOGRAPHY with biliary sphincterotomy, dilation, and stents placement;  Surgeon: Morris Washington MD;  Location: [facility] OR    IR CHEST PORT PLACEMENT > 5 YRS OF AGE  3/13/2024       REVIEW OF SYSTEMS:   ROS: 10 point ROS neg other than the symptoms noted above in the HPI and here:  Palliative Symptom Review (0=no symptom/no concern, 1=mild, 2=moderate, 3=severe):      Pain: 0-1      Fatigue: 1      Nausea: 0      Constipation: 0      Diarrhea: 0      Depressive Symptoms: 0      Anxiety: 0      Drowsiness: 0      Poor Appetite: 1      Shortness of Breath: 0      Insomnia: sleep is fractured      Overall (0 good/no concerns, 3 very poor):  1-2    GENERAL APPEARANCE: ill appearing, protuberant abdomen;  alert and no distress; neatly groomed  EYES: Eyes grossly normal to inspection, PERRLA, conjunctivae and sclerae without injection or discharge, EOM intact   RESP:  no increased work of breathing; speaks in complete sentences;   MS: No musculoskeletal defects are noted  SKIN: No suspicious lesions or rashes, hydration status appears adequate with normal skin turgor   PSYCH: Alert and oriented x3; speech- coherent , normal rate and volume; able to articulate logical thoughts, able to abstract  reason, no tangential thoughts, no hallucinations or delusions, mentation appears normal, Mood is euthymic. Affect is appropriate for this mood state and bright. Thought content is free of suicidal ideation, hallucinations, and delusions.  Eye contact is good during conversation.     Data Reviewed:  LABS: 2024 Cr 0.66; alb 2.6; alk phos 1299; ; T bili 11.2; Hgb 9.5;    IMAGIN2024 CT CAP W/O CONTRAST  IMPRESSION:  1.  Likely stable hepatic and presumed pulmonary metastatic disease compared to 2024.  2.  Stable mediastinal adenopathy compared to 2024.  3.  Increasing abdominal and pelvic ascites.    Impressions:  ECOG: 3  Decision Making Capacity:  VERY PRESENT  PDMP review:  YES, NO CONCERNS    Pancreatic adenocarcinoma with liver and lung mets  Cancer associated pain  Biliary duct stenosis with jaundice  Protein Calorie malnutrition    GOALS OF CARE:  2024 Jerel knows he has a fatal illness and he would like more time, if possible.  He is willing to continue chemo as long as the benefit exceeds the burden; he wants to be considered FULL code unless he is in a terminal state.  He would not like his dying process prolonged if he is so ill that there is no chance for a meaningful recovery.    Recommendations & Counseling:  STOP tramadol  STOP oxycodone/APAP--due to elevated LFTs  START dilaudid 2 -4 mg po q 4 hours prn moderate to severe pain--rx sent to [facility] pharmacy    Discussed diet strategies to give preference to protein  Consider seeking out the Sacrament of Healing  Melatonin 3 mg po one hour prior to target sleep time; Sleep hygiene ideas shared via AVS    F/up in 1 month    Opioid Summary    Opioid Need: This patient has a condition that necessitates treatment with an opioid for longer than 7 days. Additionally, a non-opioid alternative was not appropriate or inadequate to manage patient s pain.  Diagnosis related to controlled substance prescribing: cancer  associated pain  [place]  Review: We have reviewed the patient's record in the [place] prescription monitoring program on 04/03/2024  Opioid Toxicity Review: We have reviewed the risks of opioid therapy and completed an assessment of toxicities.  Opioid Aberrant Use Concerns: None  Urine drug screen not obtained today.   Opioid Risk Score: not previously reported;     Counseling: All of the above was explained to the patient in lay language. The patient has verbalized a clear understanding of the discussion, asked appropriate questions, which have been answered to patient's apparent satisfaction. The patient is in agreement with the above plan.    62 minutes spent on the date of the encounter doing chart review, history and exam, patient education & counseling, documentation and other activities as noted above.    Aldo Nina MD MS FAAFP CAQHPM  [facility]  Office [phone]  Fax [phone]

## 2024-04-03 NOTE — PATIENT INSTRUCTIONS
It was good to see you today, Lora Beltrán and Tahira.    Here are the things we talked about:  I sent in a prescription for dilaudid (an opiate pain pill) to the [facility] in [place]  Stop the Tramadol and oxycodone/acetaminophen  Use Melatonin 3 or 5 mg at bedtime    Sleep Hygine plan:   1. Regular bedtime and rise time  2. Avoid napping --especially naps lasting longer than 1 hour and naps late in the day.   3. Limit caffeine --avoid caffeine after lunch.   4. Exercise   5. Keep the sleep environment quiet and dark.  Noiseand light exposure during the night can disrupt sleep. White noise or ear plugs are often recommended to reduce noise. Using blackout shades or an eye mask is commonly recommended to reduce light.   6. NO television ortechnology near bedtime      Someone from the team will reach out to schedule a follow up appointment in 4 weeks and sooner, if needed.       How to get a hold of us:  For non-urgent matters, MyChart works best.    For more urgent matters, or if you prefer not to use MyChart, call our clinic nurse coordinator Norah Wilson RN at [phone]    We have an on-call number for evenings and weekends. Please call this only if you are having uncontrolled symptoms or serious side effects from your medicines: [phone].     For refills, please give us a week (5 working days) notice. We don't always have providers available everyday to do refills. If you call the day you run out of your medicine, we may not be able to refill it in time, so call 5 days in advance!    Aldo Nina MD MS FAAFP CAQHPM  [facility]  Office [phone]  Fax [phone]

## 2024-04-03 NOTE — NURSING NOTE
Is the patient currently in the state of [place]? YES    Visit mode:VIDEO    If the visit is dropped, the patient can be reconnected by: VIDEO VISIT: Send to e-mail at: [email or web address]    Will anyone else be joining the visit? NO  (If patient encounters technical issues they should call [phone]    How would you like to obtain your AVS? MyChart    Are changes needed to the allergy or medication list? Pt declined med review    Reason for visit: Consult    Grazyna NELSON

## 2024-04-03 NOTE — LETTER
"4/3/2024       RE: Obi Lawrence  [address] [address]     Dear Colleague,    Thank you for referring your patient, Obi Lawrence, to the [facility] at [facility]. Please see a copy of my visit note below.    Palliative Care Outpatient Clinic Consultation Note    Patient:  Obi Lawrence    Chief Complaint:   Obi Lawrence 68 year old male who is presenting to the palliative medicine clinic today at the request of Barbara Montenegro for a palliative care consultation secondary to metastatic pancreatic cancer.   The patient's primary care provider is:  No Ref-Primary, Physician. He is joined by his wife, daughter, and a grand daughter    History of Present Illness:  This is from Barbara Montenegro's last note:    \"Date of encounter: Mar 25, 2024     Cancer diagnosis: metastatic/stage IV form of pancreatic adenocarcinoma with liver metastasis at the time of diagnosis; as of March 2024, sites of metastasis include liver, lungs, mediastinum.     Treatment to date: first-line palliative intent chemotherapy with cycle 1/day 1 gemcitabine with nab-paclitaxel initiated on September 7 2023; progression of disease noted after first two months of this first-line treatment, then patient was enrolled on the following clinical trial:: 11/9/2023 -  Clinical Trial    Enrolled in Study as 2nd-line therapy: [id number]  Molecular Analysis for Combination Therapy Choice ([facility])      11/24/2023 - 3/1/2024 Chemotherapy    OP CLINICAL TRIAL [id number] PANCREATIC PACLITAXEL + NILOTINIB (Regimen 1)  D1/C1 Date: 11/24/2023  Plan Provider: David COSME MD  Treatment Goal: Palliative  Line of Treatment: Second Line     Progression of disease documented March 2024 at [facility]     3/26/24: Plan for third line 5FU and liposomal irinotecan         Tumor genomic profiling: reported via [employer], Sept 2023: no MSI-H; + KRAS " G12D, ARID1A, TP53 missense variant, CDKN2A frameshift mutation  See scanned report via Care Everywhere        History of Present Illness/Cancer Diagnosis and Evaluation to date:  Adopted from Dr. Bravo's consult: He was diagnosed with pancreatic cancer in summer 2023, while living in [place]. He had evaluation throughout that time, including initial findings that included a lower extremity DVT, as follows:     July 2023 lower leg ultrasound:      1. Positive DVT scan with occlusive thrombus within right calf peroneal and   gastrocnemius veins as above.      He had extensive work up in mid August 2023, for evaluation of the pancreatic tail mass and liver lesions, as follows:     August 15, 2023--CT a/p--1. Suspected pancreatic tail mass concerning for neoplasm. Recommend MRI   abdomen with and without contrast.   2. New low-density lesions throughout the liver worrisome for metastatic   disease.   3. Prominent roxana-aortic lymph nodes there is concern for possible   metastatic disease.   4. Left lower lobe 7 mm pulmonary nodule. Recommend follow-up. Metastatic   disease is not excluded.      August 15, 2023---MRI abdomen Impression     1. Reference 3 cm lesion in the pancreatic tail concerning for malignancy.  2. Developing bilateral lobar liver lesions concerning for metastatic  disease.         With strong suspicion for pancreatic adenocarcinoma, he underwent a liver biopsy as follows:  August 28, 2023--CT guided liver biopsy 1. Successful CT-guided biopsy of right hepatic lobe lesion.   2. Successful Gelfoam embolization of the biopsy tract.      Final Diagnosis     Liver; CT-guided biopsy:  -- Involved by moderately differentiated adenocarcinoma.      Microscopic Description     The microscopic findings support the diagnosis.     COMMENT:  Given the radiologic findings of pancreatic mass, the histopathologic findings in this liver biopsy are compatible with pancreaticobiliary primary.     Dr. Tenzin Ribeiro has  "reviewed pertinent slides of this case and concurs with the diagnosis.      Thus, with a histopathologic diagnosis of pancreatic adenocarcinoma, metastatic/stage IV due to involvement of liver at the time of diagnosis, he underwent further evaluation and had a high  biomarker at time of diagnosis, and initiated first-line palliative intent chemotherapy with gemcitabine and nab paclitaxel regimen initiated on or around September 7, 2023.  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     9/1/23--CT fibula: Impression     Possible acute DVT of the posterior tibial veins.\"    Plan is to start 5FU and liposomal irinotecan once his LFTs normalize.      Distressing Symptom/s: pain under right ribs worsened before his Biliary stent placement yesterday; he is reluctant to use meds that might worsen his liver function;     Patient's Disease Understanding: 'my  treatment is to get me more time to be with my family and I know I'll die from this at the end.'  Jerel also has gotten the notion he will have progressively worsening pain as he approaches the end of life.  We discussed how we will work to manage his pain.    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    Social History     Tobacco Use    Smoking status: Never     Passive exposure: Past (Growing up both parents, old work lunch room)    Smokeless tobacco: Never   Substance Use Topics    Alcohol use: Not Currently     Comment: none since July    Drug use: Never       Family History  No family history on file.    Advance Care Planning:  Advance Directive:    completed and given to the clinic yesterday for uploading  Where is written copy located: will be in [employer]  Health Care Agent Contact Information: wife and only daughter  POLST:   n/a  CODE STATUS: FULL CODE unless has an illness he is dying from    Allergies   Allergen Reactions    Iodinated Contrast Media Swelling     Developed angioedema and urticaria approximately 12 to 24 hours after undergoing CT with IV contrast.  May also have been related to the use of nonsteroidal anti-inflammatories.  See ER visits 11/1 and 11/2/2022     Current Outpatient Medications   Medication Sig Dispense Refill    Ascorbic Acid 500 MG/5ML LIQD Take 500 mg by mouth every 48 hours      cetirizine (ZYRTEC) 10 MG tablet Take 10 mg by mouth daily      ciprofloxacin (CIPRO) 500 MG tablet Take 1 tablet (500 mg) by mouth 2 times daily for 7 days 14 tablet 0    enoxaparin ANTICOAGULANT (LOVENOX) 120 MG/0.8ML syringe Inject 120 mg Subcutaneous      glucosamine 500 MG CAPS capsule Take 500 mg by mouth daily      lipase-protease-amylase (CREON 36) 58159-385164-997867 units CPEP Take 2 capsules by mouth 3 times daily (with meals) for 90 days 540 capsule 0     loperamide (IMODIUM) 2 MG capsule 2 caps at 1st sign of diarrhea & 1 cap every 2hrs until 12hrs diarrhea free. During night, 2 caps at bedtime & 2 caps every 4hrs until AM 30 capsule 0    Oyster Shell (OYSTER CALCIUM PO) Take 1 tablet by mouth daily Total 1000mg      prochlorperazine (COMPAZINE) 10 MG tablet Take 0.5 tablets (5 mg) by mouth every 6 hours as needed for nausea or vomiting 30 tablet 2    Vitamin D, Cholecalciferol, 25 MCG (1000 UT) CAPS Take 2 capsules by mouth 3 times daily      vitamin E (TOCOPHEROL) 400 units (180 mg) capsule Take 180 capsules by mouth daily       No past medical history on file.  Past Surgical History:   Procedure Laterality Date    ENDOSCOPIC RETROGRADE CHOLANGIOPANCREATOGRAM N/A 4/2/2024    Procedure: ENDOSCOPIC RETROGRADE CHOLANGIOPANCREATOGRAPHY with biliary sphincterotomy, dilation, and stents placement;  Surgeon: Morris Washington MD;  Location: [facility] OR    IR CHEST PORT PLACEMENT > 5 YRS OF AGE  3/13/2024       REVIEW OF SYSTEMS:   ROS: 10 point ROS neg other than the symptoms noted above in the HPI and here:  Palliative Symptom Review (0=no symptom/no concern, 1=mild, 2=moderate, 3=severe):      Pain: 0-1      Fatigue: 1      Nausea: 0      Constipation: 0      Diarrhea: 0      Depressive Symptoms: 0      Anxiety: 0      Drowsiness: 0      Poor Appetite: 1      Shortness of Breath: 0      Insomnia: sleep is fractured      Overall (0 good/no concerns, 3 very poor):  1-2    GENERAL APPEARANCE: ill appearing, protuberant abdomen;  alert and no distress; neatly groomed  EYES: Eyes grossly normal to inspection, PERRLA, conjunctivae and sclerae without injection or discharge, EOM intact   RESP:  no increased work of breathing; speaks in complete sentences;   MS: No musculoskeletal defects are noted  SKIN: No suspicious lesions or rashes, hydration status appears adequate with normal skin turgor   PSYCH: Alert and oriented x3; speech- coherent , normal rate and volume; able to  articulate logical thoughts, able to abstract reason, no tangential thoughts, no hallucinations or delusions, mentation appears normal, Mood is euthymic. Affect is appropriate for this mood state and bright. Thought content is free of suicidal ideation, hallucinations, and delusions.  Eye contact is good during conversation.     Data Reviewed:  LABS: 2024 Cr 0.66; alb 2.6; alk phos 1299; ; T bili 11.2; Hgb 9.5;    IMAGIN2024 CT CAP W/O CONTRAST  IMPRESSION:  1.  Likely stable hepatic and presumed pulmonary metastatic disease compared to 2024.  2.  Stable mediastinal adenopathy compared to 2024.  3.  Increasing abdominal and pelvic ascites.    Impressions:  ECOG: 3  Decision Making Capacity:  VERY PRESENT  PDMP review:  YES, NO CONCERNS    Pancreatic adenocarcinoma with liver and lung mets  Cancer associated pain  Biliary duct stenosis with jaundice  Protein Calorie malnutrition    GOALS OF CARE:  2024 Jerel knows he has a fatal illness and he would like more time, if possible.  He is willing to continue chemo as long as the benefit exceeds the burden; he wants to be considered FULL code unless he is in a terminal state.  He would not like his dying process prolonged if he is so ill that there is no chance for a meaningful recovery.    Recommendations & Counseling:  STOP tramadol  STOP oxycodone/APAP--due to elevated LFTs  START dilaudid 2 -4 mg po q 4 hours prn moderate to severe pain--rx sent to [facility] pharmacy    Discussed diet strategies to give preference to protein  Consider seeking out the Sacrament of Healing  Melatonin 3 mg po one hour prior to target sleep time; Sleep hygiene ideas shared via AVS    F/up in 1 month    Opioid Summary    Opioid Need: This patient has a condition that necessitates treatment with an opioid for longer than 7 days. Additionally, a non-opioid alternative was not appropriate or inadequate to manage patient s pain.  Diagnosis related to  controlled substance prescribing: cancer associated pain  [place]  Review: We have reviewed the patient's record in the [place] prescription monitoring program on 04/03/2024  Opioid Toxicity Review: We have reviewed the risks of opioid therapy and completed an assessment of toxicities.  Opioid Aberrant Use Concerns: None  Urine drug screen not obtained today.   Opioid Risk Score: not previously reported;     Counseling: All of the above was explained to the patient in lay language. The patient has verbalized a clear understanding of the discussion, asked appropriate questions, which have been answered to patient's apparent satisfaction. The patient is in agreement with the above plan.    62 minutes spent on the date of the encounter doing chart review, history and exam, patient education & counseling, documentation and other activities as noted above.          Again, thank you for allowing me to participate in the care of your patient.      Sincerely,    Aldo Nina MD

## 2024-04-04 NOTE — PROGRESS NOTES
Therapy: 5fu   Insurance: are Medicare     Last benefit checked was from 3/20/24. Pt did have a [employer] Medicare Adv plan, I was out of network with this plan, however this plan has termed 3/31/24. Pt has a [employer] Medicare plan effective as of 4/1/24.     Pt has 5fu via C[employer] pump with [employer] Medicare plan, 80/20 coverage, OOP $4900 (met $0 at this time), once fully met, coverage should be at 100%.     Since pt meets 5fu medicare criteria for 5fu via C[employer] pump, home disconnects are also covered.     No coverage for line care, IVF, Onpro.     In reference to referral on 4/3/24 to check 5fu coverage     Please contact Intake with any questions, [phone] or In Basket pool, [facility] ([phone]).    [phone]

## 2024-04-04 NOTE — TELEPHONE ENCOUNTER
Called pt to check in s/p procedure and discuss follow up recommendations. Pt is feeling good, he said he has some back pain but that is not new. Jaundice is improving and eating/drinking is going well.   Recs: LFT's later this week or early next week. Pt scheduled for lab drawn on Tues 4/9    Procedure/Imaging/Clinic: ERCP   Physician: Felix   Timing: ~1 month   Scope time needed: 45 min   Anesthesia: Gen   Dx: malignant biliary obstruction   Tier: 3   Location: [facility] OR   Header of letter for pt communication: ERCP    Date of procedure to be determined, pt not available on 5/21 d/t cancer infusion appt that day.    Explained they can expect a call from  for date of procedure, OR will call 1-2 days prior to procedure date with arrival time, will need a , someone to stay with them for 24 hours and should stay in town for 24 hours (within 45 min of Hospital) post procedure     Patient needs to get pre-op physical completed. If outside  health system will need physical faxed to number [phone]      If you do not get a preop physical, your procedure could be cancelled, patient voiced understanding*     Preop Plan: 3/8/24 office visit with Yulia Millan at [facility] to be utilized. January 19th EKG performed     Does patient have any history of gastric bypass/gastric surgery/altered panc/bili anatomy? none     Any recent Covid symptoms or positive covid test? none     Does patient have Hum[employer] insurance?: [facility]      Med Review     Blood thinner -  lovenox, daily at 3pm, advised this be held on Thurs 5/2  ASA - none  Diabetic - none  Any meds by injection or mouth for weight loss or diabetes- none     Patient Education r/t procedure: danni     A pre-op nurse will call 1-2 days prior to the procedure.     NPO/Prep:   Adults and Children of all ages may consume solids up to 8 hours prior to arrival time - may consume clear liquids up to 1 hour prior to arrival time.     Will message  referring provider as update, pt is new to [place] and only established care within [facility] in the last month.      Verbalized understanding of all instructions. All questions answered.      Procedure order placed, message routed to OR       [id number]  Called pt back to offer Friday May 3rd procedure date. Pt in agreement. Will send mychart confirmation.    Called pt back to   Coni Sandhu RN, BSN,   [facility]  Care coordinator

## 2024-04-05 PROBLEM — I82.413 ACUTE DEEP VEIN THROMBOSIS (DVT) OF FEMORAL VEIN OF BOTH LOWER EXTREMITIES (H): Status: ACTIVE | Noted: 2024-01-01

## 2024-04-05 PROBLEM — C25.9 METASTASIS FROM PANCREATIC CANCER (H): Status: ACTIVE | Noted: 2024-01-01

## 2024-04-05 PROBLEM — E80.6 HYPERBILIRUBINEMIA: Status: ACTIVE | Noted: 2024-01-01

## 2024-04-05 PROBLEM — C79.9 METASTASIS FROM PANCREATIC CANCER (H): Status: ACTIVE | Noted: 2024-01-01

## 2024-04-05 NOTE — MEDICATION SCRIBE - ADMISSION MEDICATION HISTORY
Medication Scribe Admission Medication History    Admission medication history is complete. The information provided in this note is only as accurate as the sources available at the time of the update.    Information Source(s): Patient and CareEverywhere/SureScripts via in-person    Pertinent Information:     Changes made to PTA medication list:  Added: None  Deleted: None  Changed: None    Allergies reviewed with patient and updates made in EHR: yes    Medication History Completed By: CRISTAL ALEMAN 4/5/2024 3:46 PM    Current Facility-Administered Medications for the 4/5/24 encounter (Hospital Encounter)   Medication    heparin 100 unit/mL injection 300-600 Units    sodium chloride (PF) 0.9% PF flush 10-20 mL    sodium chloride (PF) 0.9% PF flush 10-20 mL    sodium chloride 0.9 % infusion     PTA Med List   Medication Sig Last Dose    Ascorbic Acid 500 MG/5ML LIQD Take 500 mg by mouth every 48 hours Past Month at pt hasnt been taking past month, hs supply    cetirizine (ZYRTEC) 10 MG tablet Take 10 mg by mouth daily 4/5/2024 at am    enoxaparin ANTICOAGULANT (LOVENOX) 120 MG/0.8ML syringe Inject 120 mg Subcutaneous Past Week at sunday, pt hasnt used past couple days    glucosamine 500 MG CAPS capsule Take 500 mg by mouth daily 4/5/2024 at am    HYDROmorphone (DILAUDID) 2 MG tablet Take 1-2 tablets (2-4 mg) by mouth every 4 hours as needed for moderate to severe pain Unknown at prn pt hasnt yet started    lipase-protease-amylase (CREON 36) [id number] units CPEP Take 2 capsules by mouth 3 times daily (with meals) for 90 days 4/5/2024 at am    Oyster Shell (OYSTER CALCIUM PO) Take 1 tablet by mouth daily Total 1000mg 4/5/2024 at am    prochlorperazine (COMPAZINE) 10 MG tablet Take 0.5 tablets (5 mg) by mouth every 6 hours as needed for nausea or vomiting Unknown at prn    Vitamin D, Cholecalciferol, 25 MCG (1000 UT) CAPS Take 2 capsules by mouth 3 times daily 4/5/2024 at am    vitamin E (TOCOPHEROL) 400  units (180 mg) capsule Take 180 capsules by mouth daily 4/5/2024 at am

## 2024-04-05 NOTE — ED NOTES
Spoke with ir. Pt planned to go to ir tomorrow with premeds for contrast allergy. Ed provider aware. Heparin was started and will continue. Pt educated on notifying staff if any change in numbness, tingling, or pain to leg or coolness.

## 2024-04-05 NOTE — ED PROVIDER NOTES
EMERGENCY DEPARTMENT ENCOUNTER      NAME: Obi Lawrence  AGE: 68 year old male  YOB: 1955  MRN: [id number]  EVALUATION DATE & TIME: No admission date for patient encounter.    PCP: No Ref-Primary, Physician    ED PROVIDER: Jaiden Clayton M.D.      Chief Complaint   Patient presents with    Shortness of Breath         FINAL IMPRESSION:  Extensive left leg DVT  Metastatic adenocarcinoma  Biliary stenting  Increasing hyperbilirubinemia  Increasing alkaline phosphate    ED COURSE & MEDICAL DECISION MAKING:    Pertinent Labs & Imaging studies reviewed. (See chart for details)  68 year old male presents to the Emergency Department for evaluation of lower leg swelling.  Patient underwent replacement of biliary stent this past week.  With this he interrupted his Lovenox which he had been taking for a DVT diagnosed on 2/9/2024 per review of records.  With this he has noted increased swelling of his legs.  But he also states he has gotten 2 L of fluid over the last week which is also caused similar problems.  Exam reveals a jaundiced male somewhat thin.  Distended abdomen with pitting edema to the proximal thighs and some on the abdominal wall.  Primary concern is anasarca related to his biliary disease/cancer.  The possibility of extension of his DVT is also considered.  Will obtain baseline blood work including CBC comprehensive metabolic panel and also obtain INR and PTT.  Will repeat imaging of the lower extremities to assess for extension of DVTs. Patient appears non toxic with stable vitals signs.     11:31 AM I met with the patient for the initial interview and physical examination. Discussed plan for treatment and workup in the ED.    12:29 PM.  INR elevated 2.93 consistent with his liver disease.  Lactic acid slightly elevated 2.5.  Magnesium normal 2.1.  Lipase normal at 37.  Comprehensive metabolic panel pending.  White cell count markedly elevated at 40.2.  This is showing gradual increase compared  to his most recent values.  Hemoglobin stable at 10.2.  Platelets slightly reduced at 133.  Ultrasound lower extremities pending  12:56 PM.  Valverde phosphatase markedly elevated 1672 this compares to 1299 on 4/2/2024 total bilirubin also significantly elevated at 15.0 compared to 11.2.  Transaminases essentially unchanged.  Will obtain ultrasound of the liver/biliary system given increasing values.    2:13 PM.  Review of records indicate patient with stage IV metastatic adenocarcinoma with ongoing palliative care per oncology visit last month.  2:50 PM.  Received a call from radiology.  Patient with extensive clot in the left leg starting in the proximal femoral extending to the mid calf.  On reexamination the leg is warm and color is unchanged compared to the right leg.  It is significantly more enlarged than the right.  Call placed to interventional radiology.  Results shared with patient and his family.  3:10 PM.  Patient discussed with interventional radiology.  They are agreeable with plan for initiation of heparin and immediate intervention with thrombectomy.  Made aware of stage IV adenocarcinoma of the pancreas.  However given the immediate risk of limb loss will proceed with anticoagulation and thrombectomy.  Call will be placed to admitting physician.  Call ultimately placed to GI given recent stenting  3:19 PM.  Platelets reduced at 93.  3:26 PM I discussed the case with hospitalist, Dr Orosco, who accepts the patient for inpatient.    3:35 PM.  Patient discussed briefly with [facility].  Discovered stenting had been done by [facility] gastroenterology.  Call placed  3:43 PM.  Patient discussed with Dr. Washington of [facility] gastroenterology.  He recommends CT imaging of the abdomen to assess for further need for additional stent placement given the elevation of his total bilirubin.  Ultrasound without evidence of intrahepatic nor extrahepatic ductal dilatation.  Previously noted metastatic masses.  No obvious  stents visible.  3:55 PM.  Interventional radiology will refrain from taking patient to IR today given dye allergy.  Will likely intervene tomorrow    Patient represents critical care situation.  Approximately 55 minutes spent directly involved patient's care independent of any procedures          medical Decision Making  Obtained supplemental history:Supplemental history obtained?: Documented in chart and Family Member/Significant Other  Reviewed external records: External records reviewed?: Documented in chart and Outpatient Record: 4/3/24 at [facility] for palliative care consultation for metastatic pancreatic cancer  Care impacted by chronic illness:Anticoagulated State, Cancer/Chemotherapy, and Other: DVT, anemia  Care significantly affected by social determinants of health:N/A  Did you consider but not order tests?: Work up considered but not performed and documented in chart, if applicable  Did you interpret images independently?: Independent interpretation of ECG and images noted in documentation, when applicable.  Consultation discussion with other provider:Did you involve another provider (consultant, MH, pharmacy, etc.)?: I discussed the care with another health care provider, see documentation for details.  Admit.     At the conclusion of the encounter I discussed the results of all of the tests and the disposition. The questions were answered and return precautions provided. The patient or family acknowledged understanding and was agreeable with the care plan.       PPE: Provider wore gloves and paper mask.     MEDICATIONS GIVEN IN THE EMERGENCY:  Medications - No data to display    NEW PRESCRIPTIONS STARTED AT TODAY'S ER VISIT  New Prescriptions    No medications on file          =================================================================    HPI    Patient information was obtained from: patient, patient's spouse    Use of Intrepreter: N/A         Obi Lawrence is a  68 year old male with a pertient medical history of metastatic pancreatic cancer,  DVT, anemia, sepsis, anticoagulated on Lovenox who presents to the ED for evaluation of leg swelling.    Patient endorses bilateral lower extremity edema/numbness, generalized weakness, and shortness of breath for the past 3 days. He denies leg pain. Patient denies current diuretic use, or history of heart problems. Patient states he was told to stop his Lovenox until tomorrow.     Per chart review, patient was seen on 4/3/24 at [facility] for palliative care consultation for metastatic pancreatic cancer. Patient with metastatic/stage IV form of pancreatic adenocarcinoma with liver metastasis at the time of diagnosis; as of March 2024, sites of metastasis include liver, lungs, mediastinum. Treatment to date: first-line palliative intent chemotherapy with cycle 1/day 1 gemcitabine with nab-paclitaxel initiated on September 7 2023; progression of disease noted after first two months of this first-line treatment, then patient was enrolled on the following clinical trial:: 11/9/2023 -  Clinical Trial. Enrolled in Study as 2nd-line therapy: [id number] Molecular Analysis for Combination Therapy Choice ([employer]).       REVIEW OF SYSTEMS   Constitutional:  Generalized weakness, Denies fever, chills   Respiratory:  Shortness of breath, Denies productive cough   Cardiovascular:  Bilateral leg swelling/tingling, Denies chest pain, palpitations  GI:  Denies abdominal pain, nausea, vomiting, or change in bowel or bladder habits   Musculoskeletal:  Denies any new muscle/joint swelling  Skin:  Denies rash   Neurologic:  Denies focal weakness  All systems negative except as marked.     PAST MEDICAL HISTORY:  No past medical history on file.    PAST SURGICAL HISTORY:  Past Surgical History:   Procedure Laterality Date    ENDOSCOPIC RETROGRADE CHOLANGIOPANCREATOGRAM N/A 4/2/2024    Procedure: ENDOSCOPIC RETROGRADE  CHOLANGIOPANCREATOGRAPHY with biliary sphincterotomy, dilation, and stents placement;  Surgeon: Morris Washington MD;  Location: [facility] OR    IR CHEST PORT PLACEMENT > 5 YRS OF AGE  3/13/2024         CURRENT MEDICATIONS:      Current Facility-Administered Medications:     heparin 100 unit/mL injection 300-600 Units, 300-600 Units, Intracatheter, Once PRN, Jimmy Bravo MD    sodium chloride (PF) 0.9% PF flush 10-20 mL, 10-20 mL, Intracatheter, q1 min prn, Jimmy Bravo MD    sodium chloride (PF) 0.9% PF flush 10-20 mL, 10-20 mL, Intracatheter, q1 min prn, Jimmy Bravo MD    sodium chloride 0.9 % infusion, 250 mL, Intravenous, Continuous PRN, Jimmy Bravo MD    Current Outpatient Medications:     Ascorbic Acid 500 MG/5ML LIQD, Take 500 mg by mouth every 48 hours, Disp: , Rfl:     cetirizine (ZYRTEC) 10 MG tablet, Take 10 mg by mouth daily, Disp: , Rfl:     enoxaparin ANTICOAGULANT (LOVENOX) 120 MG/0.8ML syringe, Inject 120 mg Subcutaneous, Disp: , Rfl:     glucosamine 500 MG CAPS capsule, Take 500 mg by mouth daily, Disp: , Rfl:     HYDROmorphone (DILAUDID) 2 MG tablet, Take 1-2 tablets (2-4 mg) by mouth every 4 hours as needed for moderate to severe pain, Disp: 60 tablet, Rfl: 0    lipase-protease-amylase (CREON 36) 30313-285471-235761 units CPEP, Take 2 capsules by mouth 3 times daily (with meals) for 90 days, Disp: 540 capsule, Rfl: 0    loperamide (IMODIUM) 2 MG capsule, 2 caps at 1st sign of diarrhea & 1 cap every 2hrs until 12hrs diarrhea free. During night, 2 caps at bedtime & 2 caps every 4hrs until AM, Disp: 30 capsule, Rfl: 0    Oyster Shell (OYSTER CALCIUM PO), Take 1 tablet by mouth daily Total 1000mg, Disp: , Rfl:     prochlorperazine (COMPAZINE) 10 MG tablet, Take 0.5 tablets (5 mg) by mouth every 6 hours as needed for nausea or vomiting, Disp: 30 tablet, Rfl: 2    Vitamin D, Cholecalciferol, 25 MCG (1000 UT) CAPS, Take 2 capsules by mouth 3 times daily, Disp: , Rfl:     vitamin E (TOCOPHEROL) 400 units (180 mg)  "capsule, Take 180 capsules by mouth daily, Disp: , Rfl:     ALLERGIES:  Allergies   Allergen Reactions    Iodinated Contrast Media Swelling     Developed angioedema and urticaria approximately 12 to 24 hours after undergoing CT with IV contrast.  May also have been related to the use of nonsteroidal anti-inflammatories.  See ER visits 11/1 and 11/2/2022       FAMILY HISTORY:  No family history on file.    SOCIAL HISTORY:   Social History     Socioeconomic History    Marital status:    Tobacco Use    Smoking status: Never     Passive exposure: Past (Growing up both parents, old work lunch room)    Smokeless tobacco: Never   Substance and Sexual Activity    Alcohol use: Not Currently     Comment: none since July    Drug use: Never       VITALS:  Patient Vitals for the past 24 hrs:   BP Temp Pulse Resp SpO2 Height Weight   04/05/24 1127 113/59 97.9  F (36.6  C) 110 22 98 % 1.753 m (5' 9\") 82.1 kg (181 lb)        PHYSICAL EXAM    Constitutional:  Jaundiced, ill-appearing, Awake, alert, in no apparent distress  HENT:  Normocephalic, Atraumatic. Bilateral external ears normal. Oropharynx moist. Nose normal. Neck- Normal range of motion with no guarding, No midline cervical tenderness, Supple, No stridor.   Eyes:  PERRL, EOMI with no signs of entrapment, Conjunctiva normal, No discharge.   Respiratory:  Normal breath sounds, No respiratory distress, No wheezing.    Cardiovascular:  Lower extremity edema proximal to the thigh distally, Normal heart rate, Normal rhythm, No appreciable rubs or gallops.   GI:  Edema to the anterior abdomen, abdomen firm but non-tender, No distension, No palpable masses  Musculoskeletal: Marked bilateral lower extremity edema greater on the left than the right.  Good range of motion in all major joints. No tenderness to palpation or major deformities noted.  Integument:  Warm, Dry, No erythema, No rash.   Neurologic:  Alert & oriented, Normal motor function, Normal sensory function, No " focal deficits noted.   Psychiatric:  Affect normal, Judgment normal, Mood normal.     LAB:  All pertinent labs reviewed and interpreted.     Results for orders placed or performed during the hospital encounter of 04/05/24   XR Chest Port 1 View     Status: None    Narrative    EXAM: XR CHEST PORT 1 VIEW  LOCATION: [facility]  DATE: 4/5/2024    INDICATION: Dyspnea  COMPARISON: CT chest, abdomen and pelvis 03/23/2024      Impression    IMPRESSION: Left chest port catheter tip terminates at the superior cavoatrial junction. Shallow inspiration with left greater than right lower lobe atelectasis. Suspected pulmonary metastases are better seen on recent CT. Trace left pleural effusion. No   pneumothorax. Stable heart size and mediastinal contours.   US Lower Extremity Venous Duplex Bilateral     Status: Abnormal   Result Value Ref Range    Radiologist flags DVT (AA)     Narrative    EXAM: US LOWER EXTREMITY VENOUS DUPLEX BILATERAL  LOCATION: [facility]  DATE: 4/5/2024    INDICATION: Patient with bilateral lower extremity swelling anasarca, history of left sided DVT with interruption of anticoagulation for EGD  COMPARISON: 02/09/2024  TECHNIQUE: Venous Duplex ultrasound of bilateral lower extremities with and without compression, augmentation and duplex. Color flow and spectral Doppler with waveform analysis performed.    FINDINGS: Exam includes the common femoral, femoral, popliteal veins as well as segmentally visualized deep calf veins and greater saphenous vein.     RIGHT: There is occlusive thrombus within the right posterior tibial and one of the paired peroneal veins. No extension into the popliteal vein or additional DVT. No popliteal cyst.     LEFT: Extensive deep venous thrombosis throughout the left lower extremity deep venous system, including occlusive thrombus in the common femoral vein extending into the visualized profunda femoris as well as into the  femoral, popliteal, posterior   tibial, and peroneal veins.  No popliteal cyst.      Impression    IMPRESSION:  1.  Extensive left lower extremity deep venous thrombosis extending from the common femoral vein through the visualized proximal profunda femoris, femoral vein, popliteal vein, and calf veins.  2.  Thrombus within the right posterior tibial and one of the paired peroneal veins in the right calf.      [Critical Result: DVT]    Finding was identified on 4/5/2024 2:43 PM CDT.     Dr. Clayton was contacted by me on 4/5/2024 2:52 PM CDT and verbalized understanding of the critical result.   US Abdomen Limited (RUQ)     Status: None    Narrative    EXAM: US ABDOMEN LIMITED  LOCATION: [facility]  DATE: 4/5/2024    INDICATION: Increasing bilirubin, recent biliary stent placement  COMPARISON: Abdominal ultrasound 03/28/2024, MRI 03/26/2024  TECHNIQUE: Limited abdominal ultrasound.    FINDINGS:    GALLBLADDER: The gallbladder is contracted. No shadowing gallstones.    BILE DUCTS: No intrahepatic biliary ductal dilation. The common bile duct measures up to 4 mm. Known biliary stents are not well visualized.     LIVER: The infiltrative centrally necrotic masses within the right and left hepatic lobes are unchanged compared to recent ultrasound and MRI accounting for differences in technique, with the largest component measuring 12.5 x 9.5 x 9.1 cm   sonographically. The main portal vein is patent with hepatopedal flow.    RIGHT KIDNEY: No hydronephrosis.    PANCREAS: The pancreas is largely obscured by overlying gas.    Trace ascites.      Impression    IMPRESSION:  1.  No sonographic findings of intra or extrahepatic biliary ductal dilation. Known biliary stents are not well visualized.  2.  Unchanged large heterogeneous bilobar liver metastases.   INR     Status: Abnormal   Result Value Ref Range    INR 2.93 (H) 0.85 - 1.15   Comprehensive metabolic panel     Status: Abnormal   Result Value  Ref Range    Sodium 129 (L) 135 - 145 mmol/L    Potassium 4.3 3.4 - 5.3 mmol/L    Carbon Dioxide (CO2) 21 (L) 22 - 29 mmol/L    Anion Gap 11 7 - 15 mmol/L    Urea Nitrogen 37.7 (H) 8.0 - 23.0 mg/dL    Creatinine 0.77 0.67 - 1.17 mg/dL    GFR Estimate >90 >60 mL/min/1.73m2    Calcium 9.8 8.8 - 10.2 mg/dL    Chloride 97 (L) 98 - 107 mmol/L    Glucose 179 (H) 70 - 99 mg/dL    Alkaline Phosphatase 1,672 (H) 40 - 150 U/L     (H) 0 - 45 U/L    ALT 70 0 - 70 U/L    Protein Total 5.2 (L) 6.4 - 8.3 g/dL    Albumin 2.4 (L) 3.5 - 5.2 g/dL    Bilirubin Total 15.0 (H) <=1.2 mg/dL   Lipase     Status: Normal   Result Value Ref Range    Lipase 37 13 - 60 U/L   Lactic acid whole blood     Status: Abnormal   Result Value Ref Range    Lactic Acid 2.5 (H) 0.7 - 2.0 mmol/L   Magnesium     Status: Normal   Result Value Ref Range    Magnesium 2.1 1.7 - 2.3 mg/dL   Nt probnp inpatient     Status: Normal   Result Value Ref Range    N terminal Pro BNP Inpatient 391 0 - 900 pg/mL   Troponin T, High Sensitivity (now)     Status: Abnormal   Result Value Ref Range    Troponin T, High Sensitivity 25 (H) <=22 ng/L   CBC with platelets and differential     Status: Abnormal   Result Value Ref Range    WBC Count 40.2 (H) 4.0 - 11.0 10e3/uL    RBC Count 3.32 (L) 4.40 - 5.90 10e6/uL    Hemoglobin 10.2 (L) 13.3 - 17.7 g/dL    Hematocrit 31.7 (L) 40.0 - 53.0 %    MCV 96 78 - 100 fL    MCH 30.7 26.5 - 33.0 pg    MCHC 32.2 31.5 - 36.5 g/dL    RDW 22.4 (H) 10.0 - 15.0 %    Platelet Count 133 (L) 150 - 450 10e3/uL    % Neutrophils 89 %    % Lymphocytes 3 %    % Monocytes 4 %    % Eosinophils 0 %    % Basophils 0 %    % Immature Granulocytes 4 %    NRBCs per 100 WBC 0 <1 /100    Absolute Neutrophils 35.5 (H) 1.6 - 8.3 10e3/uL    Absolute Lymphocytes 1.2 0.8 - 5.3 10e3/uL    Absolute Monocytes 1.6 (H) 0.0 - 1.3 10e3/uL    Absolute Eosinophils 0.1 0.0 - 0.7 10e3/uL    Absolute Basophils 0.2 0.0 - 0.2 10e3/uL    Absolute Immature Granulocytes 1.7 (H)  <=0.4 10e3/uL    Absolute NRBCs 0.0 10e3/uL   Lactic acid whole blood     Status: Normal   Result Value Ref Range    Lactic Acid 1.8 0.7 - 2.0 mmol/L   Extra Tube ([place] Draw)     Status: None (In process)    Narrative    The following orders were created for panel order Extra Tube ([place] Draw).  Procedure                               Abnormality         Status                     ---------                               -----------         ------                     Extra Blue Top Tube[[id number]]                              In process                 Extra Red Top Tube[[id number]]                               In process                 Extra Green Top (Lithium...[[id number]]                      In process                 Extra Purple Top Tube[[id number]]                            In process                   Please view results for these tests on the individual orders.   CBC with platelets     Status: Abnormal   Result Value Ref Range    WBC Count 43.0 (H) 4.0 - 11.0 10e3/uL    RBC Count 3.41 (L) 4.40 - 5.90 10e6/uL    Hemoglobin 10.6 (L) 13.3 - 17.7 g/dL    Hematocrit 33.2 (L) 40.0 - 53.0 %    MCV 97 78 - 100 fL    MCH 31.1 26.5 - 33.0 pg    MCHC 31.9 31.5 - 36.5 g/dL    RDW 22.5 (H) 10.0 - 15.0 %    Platelet Count 93 (L) 150 - 450 10e3/uL   CBC with platelets differential     Status: Abnormal    Narrative    The following orders were created for panel order CBC with platelets differential.  Procedure                               Abnormality         Status                     ---------                               -----------         ------                     CBC with platelets and d...[[id number]]  Abnormal            Final result                 Please view results for these tests on the individual orders.         RADIOLOGY:  Reviewed all pertinent imaging. Please see official radiology report.  XR Chest Port 1 View    (Results Pending)       EKG: Normal sinus rhythm.  Rate of 96.  Q waves anteriorly.  No  acute ST segment abnormalities.  Unchanged compared to April 5, 2024  I have independently reviewed and interpreted the EKG(s) documented above.           I, Aubrey Tyler, am serving as a scribe to document services personally performed by Jaiden Clayton MD, based on my observation and the provider's statements to me. I, Jaiden Clayton MD attest that Aubrey Tyler is acting in a scribe capacity, has observed my performance of the services and has documented them in accordance with my direction.    Jaiden Clayton M.D.  Emergency Medicine  [facility] EMERGENCY DEPARTMENT     Jaidne Clayton MD  04/05/24 1529       Jaiden Clayton MD  04/05/24 1541       Jaiden Clayton MD  04/05/24 [id number]

## 2024-04-05 NOTE — H&P
[facility]    History and Physical - Hospitalist Service       Date of Admission:  4/5/2024    Assessment & Plan      68 year old male with a past medical history of metastatic pancreatic cancer,  DVT, anemia, sepsis, anticoagulated on Lovenox who presents to the ED for evaluation of leg swelling and pain.  Admitted with extensive DVT with plan of thrombectomy.    Extensive left lower arm DVT  -History of DVT on 2/2024 and patient has been on Lovenox.  -Lovenox has been on hold for recent biliary stent  -Venous ultrasound showed Extensive left lower extremity deep venous thrombosis extending from the common femoral vein through the visualized proximal profunda femoris, femoral vein, popliteal vein, and calf veins.   -Start IV heparin  -IR consult, appreciate input   -Plan for thrombectomy.    Hyperbilirubinemia   - Secondary to metastatic pancreatic cancer  - S/P biliary stent 1/2/2024  - Bilirubin is increasing after stent placement  - GI consult, appreciate input-   - May need repeat CT abdomen, defer to GI     Metastatic pancreatic adenocarcinoma  - S/p palliative chemotherapy  -Follow-up with oncology as outpatient    Chronic pain syndrome  -Secondary to migrated cancer  -Resume home Dilaudid  -Add IV Dilaudid as needed    Hyponatremia  - Combination of dehydration versus SIADH  - Start IV fluid hydration  - Continue to monitor sodium level.      Diet: NPO per Anesthesia Guidelines for Procedure/Surgery Except for: Meds  Clear Liquid Diet    DVT Prophylaxis:-Heparin  [place] Catheter: Not present  Lines: PRESENT             Cardiac Monitoring: ACTIVE order. Indication: DVT  Code Status: Full Code      Clinically Significant Risk Factors Present on Admission         # Hyponatremia: Lowest Na = 129 mmol/L in last 2 days, will monitor as appropriate   # Hypercalcemia: corrected calcium is >10.1, will monitor as appropriate    # Hypoalbuminemia: Lowest albumin = 2.4 g/dL at 4/5/2024 11:50 AM,  "will monitor as appropriate  # Drug Induced Coagulation Defect: home medication list includes an anticoagulant medication  # Thrombocytopenia: Lowest platelets = 93 in last 2 days, will monitor for bleeding        # Overweight: Estimated body mass index is 26.73 kg/m  as calculated from the following:    Height as of this encounter: 1.753 m (5' 9\").    Weight as of this encounter: 82.1 kg (181 lb).              Disposition Plan      Expected Discharge Date: 04/07/2024                  Anthony Orosco MD  Hospitalist Service  [facility]  Securely message with [employer] (more info)  Text page via [facility] Paging/Directory     ______________________________________________________________________    Chief Complaint   Legs pain and swelling to    History is obtained from the patient    History of Present Illness   Obi Lawrence is a 68 year old male with a past medical history of metastatic pancreatic cancer,  DVT, anemia, sepsis, anticoagulated on Lovenox who presents to the ED for evaluation of leg swelling and pain.  Basically the patient endorses bilateral lower extremity edema/numbness, generalized weakness, and shortness of breath for the past 3 days. He denies leg pain. Patient denies current diuretic use, or history of heart problems. Patient states he was told to stop his Lovenox until tomorrow because of recent biliary stent placement.  Of note the patient was seen recently for biliary stent at the [facility] which was successfully done on 4/2/2024.  In the ER patient was found with extensive DVT and plan for IR thrombectomy      Past Medical History    No past medical history on file.    Past Surgical History   Past Surgical History:   Procedure Laterality Date    ENDOSCOPIC RETROGRADE CHOLANGIOPANCREATOGRAM N/A 4/2/2024    Procedure: ENDOSCOPIC RETROGRADE CHOLANGIOPANCREATOGRAPHY with biliary sphincterotomy, dilation, and stents placement;  Surgeon: Morris Washington MD;  Location:  OR    " IR CHEST PORT PLACEMENT > 5 YRS OF AGE  3/13/2024       Prior to Admission Medications   Prior to Admission Medications   Prescriptions Last Dose Informant Patient Reported? Taking?   Ascorbic Acid 500 MG/5ML LIQD Past Month at pt hasnt been taking past month, hs supply  Yes Yes   Sig: Take 500 mg by mouth every 48 hours   HYDROmorphone (DILAUDID) 2 MG tablet Unknown at prn pt hasnt yet started  No Yes   Sig: Take 1-2 tablets (2-4 mg) by mouth every 4 hours as needed for moderate to severe pain   Oyster Shell (OYSTER CALCIUM PO) 4/5/2024 at am  Yes Yes   Sig: Take 1 tablet by mouth daily Total 1000mg   Vitamin D, Cholecalciferol, 25 MCG (1000 UT) CAPS   Yes No   Sig: Take 2 capsules by mouth 3 times daily   cetirizine (ZYRTEC) 10 MG tablet 4/5/2024 at am  Yes Yes   Sig: Take 10 mg by mouth daily   enoxaparin ANTICOAGULANT (LOVENOX) 120 MG/0.8ML syringe Past Week at sunday, pt hasnt used past couple days  Yes Yes   Sig: Inject 120 mg Subcutaneous   glucosamine 500 MG CAPS capsule 4/5/2024 at am  Yes Yes   Sig: Take 500 mg by mouth daily   lipase-protease-amylase (CREON 36) [id number] units CPEP 4/5/2024 at am  No Yes   Sig: Take 2 capsules by mouth 3 times daily (with meals) for 90 days   prochlorperazine (COMPAZINE) 10 MG tablet   No No   Sig: Take 0.5 tablets (5 mg) by mouth every 6 hours as needed for nausea or vomiting   vitamin E (TOCOPHEROL) 400 units (180 mg) capsule   Yes No   Sig: Take 180 capsules by mouth daily      Facility-Administered Medications Last Administration Doses Remaining   heparin 100 unit/mL injection 300-600 Units None recorded 1   sodium chloride (PF) 0.9% PF flush 10-20 mL None recorded 2   sodium chloride (PF) 0.9% PF flush 10-20 mL None recorded 2   sodium chloride 0.9 % infusion None recorded 1           Review of Systems    The 10 point Review of Systems is negative other than noted in the HPI or here.     Social History   I have reviewed this patient's social history and  updated it with pertinent information if needed.  Social History     Tobacco Use    Smoking status: Never     Passive exposure: Past (Growing up both parents, old work lunch room)    Smokeless tobacco: Never   Substance Use Topics    Alcohol use: Not Currently     Comment: none since July    Drug use: Never         Family History     No significant family history, including no history of:   Negative for diabetes.      Allergies   Allergies   Allergen Reactions    Iodinated Contrast Media Swelling     Developed angioedema and urticaria approximately 12 to 24 hours after undergoing CT with IV contrast.  May also have been related to the use of nonsteroidal anti-inflammatories.  See ER visits 11/1 and 11/2/2022        Physical Exam   Vital Signs: Temp: 97.9  F (36.6  C)   BP: 113/59 Pulse: 110   Resp: 22 SpO2: 98 %      Weight: 181 lbs 0 oz    General Appearance: Sleeping comfortably in bed in no acute respiratory distress  Respiratory: Decreased breath sounds on lung bases: Cardiac no wheeze.  Cardiovascular: Normal heart sound, soft systolic murmur in aortic area.  GI: Soft, normal bowel sounds, mild tenderness on epigastric area.  Skin: Diffuse jaundice with +3 bilateral legs edema.  Other: Grossly intact, no focal deficit.    Medical Decision Making       75 MINUTES SPENT BY ME on the date of service doing chart review, history, exam, documentation & further activities per the note.      Data     I have personally reviewed the following data over the past 24 hrs:    43.0 (H)  \   10.6 (L)   / 93 (L)     129 (L) 97 (L) 37.7 (H) /  179 (H)   4.3 21 (L) 0.77 \     ALT: 70 AST: 125 (H) AP: 1,672 (H) TBILI: 15.0 (H)   ALB: 2.4 (L) TOT PROTEIN: 5.2 (L) LIPASE: 37     Trop: 25 (H) BNP: 391     Procal: N/A CRP: N/A Lactic Acid: 1.8       INR:  2.93 (H) PTT:  N/A   D-dimer:  N/A Fibrinogen:  N/A       Imaging results reviewed over the past 24 hrs:   Recent Results (from the past 24 hour(s))   XR Chest Port 1 View     Narrative    EXAM: XR CHEST PORT 1 VIEW  LOCATION: [facility]  DATE: 4/5/2024    INDICATION: Dyspnea  COMPARISON: CT chest, abdomen and pelvis 03/23/2024      Impression    IMPRESSION: Left chest port catheter tip terminates at the superior cavoatrial junction. Shallow inspiration with left greater than right lower lobe atelectasis. Suspected pulmonary metastases are better seen on recent CT. Trace left pleural effusion. No   pneumothorax. Stable heart size and mediastinal contours.   US Lower Extremity Venous Duplex Bilateral   Result Value    Radiologist flags DVT (FILEMON)    Narrative    EXAM: US LOWER EXTREMITY VENOUS DUPLEX BILATERAL  LOCATION: [facility]  DATE: 4/5/2024    INDICATION: Patient with bilateral lower extremity swelling anasarca, history of left sided DVT with interruption of anticoagulation for EGD  COMPARISON: 02/09/2024  TECHNIQUE: Venous Duplex ultrasound of bilateral lower extremities with and without compression, augmentation and duplex. Color flow and spectral Doppler with waveform analysis performed.    FINDINGS: Exam includes the common femoral, femoral, popliteal veins as well as segmentally visualized deep calf veins and greater saphenous vein.     RIGHT: There is occlusive thrombus within the right posterior tibial and one of the paired peroneal veins. No extension into the popliteal vein or additional DVT. No popliteal cyst.     LEFT: Extensive deep venous thrombosis throughout the left lower extremity deep venous system, including occlusive thrombus in the common femoral vein extending into the visualized profunda femoris as well as into the femoral, popliteal, posterior   tibial, and peroneal veins.  No popliteal cyst.      Impression    IMPRESSION:  1.  Extensive left lower extremity deep venous thrombosis extending from the common femoral vein through the visualized proximal profunda femoris, femoral vein, popliteal vein, and calf  veins.  2.  Thrombus within the right posterior tibial and one of the paired peroneal veins in the right calf.      [Critical Result: DVT]    Finding was identified on 4/5/2024 2:43 PM CDT.     Dr. Clayton was contacted by me on 4/5/2024 2:52 PM CDT and verbalized understanding of the critical result.   US Abdomen Limited (RUQ)    Narrative    EXAM: US ABDOMEN LIMITED  LOCATION: [facility]  DATE: 4/5/2024    INDICATION: Increasing bilirubin, recent biliary stent placement  COMPARISON: Abdominal ultrasound 03/28/2024, MRI 03/26/2024  TECHNIQUE: Limited abdominal ultrasound.    FINDINGS:    GALLBLADDER: The gallbladder is contracted. No shadowing gallstones.    BILE DUCTS: No intrahepatic biliary ductal dilation. The common bile duct measures up to 4 mm. Known biliary stents are not well visualized.     LIVER: The infiltrative centrally necrotic masses within the right and left hepatic lobes are unchanged compared to recent ultrasound and MRI accounting for differences in technique, with the largest component measuring 12.5 x 9.5 x 9.1 cm   sonographically. The main portal vein is patent with hepatopedal flow.    RIGHT KIDNEY: No hydronephrosis.    PANCREAS: The pancreas is largely obscured by overlying gas.    Trace ascites.      Impression    IMPRESSION:  1.  No sonographic findings of intra or extrahepatic biliary ductal dilation. Known biliary stents are not well visualized.  2.  Unchanged large heterogeneous bilobar liver metastases.

## 2024-04-05 NOTE — PROGRESS NOTES
Interventional Radiology  4/5/2024    IR aware of LLE DVT consult. Initially planning for non emergent intervention today 4/5; however, on chart review noted to have severe contrast allergy with angioedema.     Will plan for procedure tomorrow 4/6. Premedication ordered to be given prior to procedure (Methylprednisolone 12hr and 2hr prior to tentative AM procedure 4/6). Exact timing of procedure dependent on IR schedule availability.    Patient to remain NPO after midnight 4/6    RADHA Chaves CNP  Interventional Radiology

## 2024-04-05 NOTE — ED TRIAGE NOTES
Patient who was diagnosis with pancreatic cancer Aug 2023, stopped chemotherapy one month ago because  liver enzymes were climbing, had stent in CBD placed 3 days ago here for generalized weakness and SOB. Jaundice appearing,. Poor appetite

## 2024-04-05 NOTE — ED NOTES
"[facility]  ED Nurse Handoff Report    ED Chief complaint: Shortness of Breath      ED Diagnosis:   Final diagnoses:   Acute deep vein thrombosis (DVT) of femoral vein of both lower extremities (H)   Metastasis from pancreatic cancer (H)   Hyperbilirubinemia       Code Status: Full Code    Allergies:   Allergies   Allergen Reactions    Iodinated Contrast Media Swelling     Developed angioedema and urticaria approximately 12 to 24 hours after undergoing CT with IV contrast.  May also have been related to the use of nonsteroidal anti-inflammatories.  See ER visits 11/1 and 11/2/2022       Patient Story: Patient who was diagnosis with pancreatic cancer Aug 2023, stopped chemotherapy one month ago because  liver enzymes were climbing, had stent in CBD placed 3 days ago here for generalized weakness and SOB. Jaundice appearing,. Poor appetite       Focused Assessment:  edema bilateral legs    Treatments and/or interventions provided: hep gtt 10 due at 2100  Patient's response to treatments and/or interventions:     To be done/followed up on inpatient unit:  see orders    Does this patient have any cognitive concerns?:  no    Activity level - Baseline/Home:  Independent currently on bedrest  Activity Level - Current:   Independent    Patient's Preferred language: English   Needed?: No    Isolation: None  Infection: Not Applicable  Patient tested for COVID 19 prior to admission: NO  Bariatric?: No    Vital Signs:   Vitals:    04/05/24 1127 04/05/24 1625   BP: 113/59 107/63   Pulse: 110 86   Resp: 22 22   Temp: 97.9  F (36.6  C)    SpO2: 98% 95%   Weight: 82.1 kg (181 lb)    Height: 1.753 m (5' 9\")        Cardiac Rhythm:     Was the PSS-3 completed:   Yes  What interventions are required if any?               Family Comments: wife at bedside    For the majority of the shift this patient's behavior was Green.   ED NURSE : stacie pegueor, ed       "

## 2024-04-05 NOTE — ED NOTES
Expected Patient Referral to ED  10:52 AM    Referring Clinic/Provider:  Family medicine    Reason for referral/Clinical facts:  History of pancreatic cancer.  Biliary stent replaced last Tuesday and held Coumadin.  History of DVT.  Now with anasarca and shortness of breath    Recommendations provided:  Send to ED for further evaluation    Caller was informed that this institution does possess the capabilities and/or resources to provide for patient and should be transferred to our facility.    Discussed that if direct admit is sought and any hurdles are encountered, this ED would be happy to see the patient and evaluate.    Informed caller that recommendations provided are recommendations based only on the facts provided and that they responsible to accept or reject the advice, or to seek a formal in person consultation as needed and that this ED will see/treat patient should they arrive.      Jaiden Clayton MD  [facility] EMERGENCY DEPARTMENT  [address] [place] [id number]  [phone]       Jaiden Clayton MD  04/05/24 1057

## 2024-04-05 NOTE — TELEPHONE ENCOUNTER
Oncology Nurse Triage - DVT/Edema  Situation: Pt is calling regarding concerns for worsening bilateral leg edema that extends from stomach and thighs through feet and increased SOB x 2 days    Background:   Treating Provider:  Dr. Washington, gastroenterology    Date of last office visit: Oncology: 3/25/24 with CHEL Garcia    Is patient on active treatment: (if yes, when and drug): Yes: Per Barbara's note on 3/25/24: 3/26/24: Plan for third line 5FU and liposomal irinotecan     Recent surgery (if yes - when): Yes: 4/2/24 endoscopic retrograde cholangiopancreatography with biliary sphincterotomy, dilation, and stents placed.    Recent hospitalization: YES 4/2/24 Dr. Washington ordered labs     Does patient take anticoagulation medication: YES lovenox  - if yes, have they missed a dose recently: YES Held since Monday per doctor's order;  Had procedure to open bile ducts    Assessment of Symptoms:  Onset of symptoms: swelling started a week ago Tuesday (3/26/24); but worsened the last couple of days     Duration of symptoms:Worsening the last two days, but started on 3/26/24  Both legs equally swollen, thighs down to feet and into stomach    Symptoms Evaluation:   area is warm to the touch, negative India's sign , shortness of breath , and swollen lower extremities - bilateral     SOB with exertion. When he sits down he huffs and puffs for about 5 minutes.  No oxygen.   Does not hurt to take a deep breath.    History of DVT or PE: YES On lovenox. Started holding on Monday per provider direction. To hold until Saturday    Family history of clotting disorder: Yes Mother had blood clots    Has patient had long period of being inactive recently (travel): YES trying to move around but mostly sedentary.    No chest pain, tachycardia, fever, or hemoptysis.    1028: Secure chat sent to Gastro and Onc RN[facility]; Dr. Washington added to chat.   1044: Both RNCC's and Dr. Washington advised ED to rule out PE.    1047: Called pt and advised ED. They  prefer to go to [facility] in [place].  1050: Call placed to [facility] ED: Gave report to Dr. Villa.    Routed to both Onc and Gastro Care Teams

## 2024-04-06 NOTE — PROGRESS NOTES
Returned to ED36, patient awake and alert, wife with patient. Report to Stacy FRIEND, Site behind left knee reviewed, no bleeding or swelling noted. Pedal pulse palpable left. Orders reviewed with Stacy FRIEND.

## 2024-04-06 NOTE — PLAN OF CARE
Problem: VTE (Venous Thromboembolism)  Goal: Tissue Perfusion  Outcome: Progressing     Problem: VTE (Venous Thromboembolism)  Goal: Right Ventricular Function  Outcome: Progressing    Pt denies pain or nausea. Pt on bedrest per order, pt compliant. No new bleeding noted at old IV site. Left leg dressing has drainage but drainage is within gauze. Heparin going per order, see MAR. VSS.     H[address] St RN

## 2024-04-06 NOTE — PROCEDURES
[facility]    Procedure: IR Procedure Note    Date/Time: 4/6/2024 10:05 AM    Performed by: Delgado Barth MD  Authorized by: Delgado Barth MD      UNIVERSAL PROTOCOL   Site Marked: NA  Prior Images Obtained and Reviewed:  Yes  Required items: Required blood products, implants, devices and special equipment available    Patient identity confirmed:  Verbally with patient, arm band, provided demographic data and hospital-assigned identification number  Patient was reevaluated immediately before administering moderate or deep sedation or anesthesia  Confirmation Checklist:  Patient's identity using two indicators, relevant allergies, procedure was appropriate and matched the consent or emergent situation and correct equipment/implants were available  Time out: Immediately prior to the procedure a time out was called    Universal Protocol: the Joint Commission Universal Protocol was followed    Preparation: Patient was prepped and draped in usual sterile fashion       ANESTHESIA    Anesthesia:  Local infiltration  Local Anesthetic:  Lidocaine 1% without epinephrine      SEDATION  Patient Sedated: Yes    Vital signs: Vital signs monitored during sedation    See dictated procedure note for full details.  Findings: LLE DVT Thrombectomy as per PACS.    Specimens: none    Complications: None    Condition: Stable    Plan: Interventional Radiology Post-Procedure Note    Procedure: LLE venogram with thrombectomy.    Attending: Delgado Barth MD    Findings: Occlusive DVT within the left femoral vein. Inline patent blood flow following thrombectomy.    Plan: Bedrest until tomorrow AM when pursestring is removed. Continue anticoagulation.      PROCEDURE    Patient Tolerance:  Patient tolerated the procedure well with no immediate complications  Length of time physician/provider present for 1:1 monitoring during sedation: 30

## 2024-04-06 NOTE — PROGRESS NOTES
[facility]    PROGRESS NOTE - Hospitalist Service    Assessment and Plan    Principal Problem:    Hyperbilirubinemia  Active Problems:    Acute deep vein thrombosis (DVT) of femoral vein of both lower extremities (H)    Metastasis from pancreatic cancer (H)    68 year old male with a past medical history of metastatic pancreatic cancer,  DVT, anemia, sepsis, anticoagulated on Lovenox who presents to the ED for evaluation of leg swelling and pain.  Admitted with extensive DVT with plan of thrombectomy.     Extensive left lower arm DVT  - History of DVT on 2/2024 and patient has been on Lovenox.  - Lovenox has been on hold for recent biliary stent  - Venous ultrasound showed Extensive left lower extremity deep venous thrombosis extending from the common femoral vein through the visualized proximal profunda femoris, femoral vein, popliteal vein, and calf veins.   - Start IV heparin  - IR consult, appreciate input   - S/p thrombectomy on 4/6/2024.  -Postprocedure orders as per IR  Continue IV heparin for now-      Hyperbilirubinemia   - Secondary to metastatic pancreatic cancer  - S/P biliary stent 1/2/2024  - Bilirubin is increasing after stent placement  - GI consult, appreciate input-   - Check CT abdomen as per GI      Metastatic pancreatic adenocarcinoma  - S/p palliative chemotherapy  - Follow-up with oncology as outpatient     Chronic pain syndrome  - Secondary to migrated cancer  - Resume home Dilaudid  - Add IV Dilaudid as needed    Leukocytosis  -Etiology is unclear, steroid versus infection  -Patient is immunocompromised with cancer treatment.  -Slight elevated procalcitonin  -Started Zosyn empirically.  -Continue to monitor CBC     Hyponatremia  - Combination of dehydration versus SIADH  - Start IV fluid hydration  - Continue to monitor sodium level.    40 MINUTES SPENT BY ME on the date of service doing chart review, history, exam, documentation & further activities per the note  -    VTE prophylaxis: IV heparin  DIET: Orders Placed This Encounter      Regular Diet Adult      Disposition/Barriers to discharge: IV heparin, IV antibiotic, monitor white blood cells and LFTs  Code Status: Full Code    Subjective:  Obi is feeling better today, had thrombectomy this morning.  Denies chest pain, mild abdominal pain    PHYSICAL EXAM  Vitals:    04/05/24 1127   Weight: 82.1 kg (181 lb)     B/P:99/66 T:97.8 P:76 R:17     Intake/Output Summary (Last 24 hours) at 4/6/2024 1305  Last data filed at 4/6/2024 0717  Gross per 24 hour   Intake 1003 ml   Output 600 ml   Net 403 ml      Body mass index is 26.73 kg/m .    Constitutional: awake, alert, cooperative, no apparent distress, and appears stated age  Respiratory: No increased work of breathing, good air exchange, clear to auscultation bilaterally, no crackles or wheezing  Cardiovascular: Normal apical impulse, regular rate and rhythm, normal S1 and S2, no S3 or S4, and no murmur noted  GI: No scars, normal bowel sounds, soft, non-distended, non-tender, no masses palpated, no hepatosplenomegally  Skin: Positive jaundice  Musculoskeletal: Bilateral +2 legs edema more on the left side  Neurologic: Awake, alert, oriented to name, place and time.  Cranial nerves II-XII are grossly intact.  Motor is 5 out of 5 bilaterally.   Sensory is intact.    Neuropsychiatric: Appropriate with examiner      PERTINENT LABS/IMAGING:    I have personally reviewed the following data over the past 24 hrs:    40.6 (H)  \   9.2 (L)   / 101 (L)     128 (L) 96 (L) 33.0 (H) /  185 (H)   4.4 24 0.58 (L) \     ALT: 60 AST: 105 (H) AP: 1,552 (H) TBILI: 13.6 (H)   ALB: 2.2 (L) TOT PROTEIN: 4.8 (L) LIPASE: N/A     Procal: 0.93 (H) CRP: N/A Lactic Acid: 1.5         Imaging results reviewed over the past 24 hrs:   Recent Results (from the past 24 hour(s))   XR Chest Port 1 View    Narrative    EXAM: XR CHEST PORT 1 VIEW  LOCATION: [facility]  DATE:  4/5/2024    INDICATION: Dyspnea  COMPARISON: CT chest, abdomen and pelvis 03/23/2024      Impression    IMPRESSION: Left chest port catheter tip terminates at the superior cavoatrial junction. Shallow inspiration with left greater than right lower lobe atelectasis. Suspected pulmonary metastases are better seen on recent CT. Trace left pleural effusion. No   pneumothorax. Stable heart size and mediastinal contours.   US Lower Extremity Venous Duplex Bilateral   Result Value    Radiologist flags DVT (FILEMON)    Narrative    EXAM: US LOWER EXTREMITY VENOUS DUPLEX BILATERAL  LOCATION: [facility]  DATE: 4/5/2024    INDICATION: Patient with bilateral lower extremity swelling anasarca, history of left sided DVT with interruption of anticoagulation for EGD  COMPARISON: 02/09/2024  TECHNIQUE: Venous Duplex ultrasound of bilateral lower extremities with and without compression, augmentation and duplex. Color flow and spectral Doppler with waveform analysis performed.    FINDINGS: Exam includes the common femoral, femoral, popliteal veins as well as segmentally visualized deep calf veins and greater saphenous vein.     RIGHT: There is occlusive thrombus within the right posterior tibial and one of the paired peroneal veins. No extension into the popliteal vein or additional DVT. No popliteal cyst.     LEFT: Extensive deep venous thrombosis throughout the left lower extremity deep venous system, including occlusive thrombus in the common femoral vein extending into the visualized profunda femoris as well as into the femoral, popliteal, posterior   tibial, and peroneal veins.  No popliteal cyst.      Impression    IMPRESSION:  1.  Extensive left lower extremity deep venous thrombosis extending from the common femoral vein through the visualized proximal profunda femoris, femoral vein, popliteal vein, and calf veins.  2.  Thrombus within the right posterior tibial and one of the paired peroneal veins in the  right calf.      [Critical Result: DVT]    Finding was identified on 4/5/2024 2:43 PM CDT.     Dr. Clayton was contacted by me on 4/5/2024 2:52 PM CDT and verbalized understanding of the critical result.   US Abdomen Limited (RUQ)    Narrative    EXAM: US ABDOMEN LIMITED  LOCATION: [facility]  DATE: 4/5/2024    INDICATION: Increasing bilirubin, recent biliary stent placement  COMPARISON: Abdominal ultrasound 03/28/2024, MRI 03/26/2024  TECHNIQUE: Limited abdominal ultrasound.    FINDINGS:    GALLBLADDER: The gallbladder is contracted. No shadowing gallstones.    BILE DUCTS: No intrahepatic biliary ductal dilation. The common bile duct measures up to 4 mm. Known biliary stents are not well visualized.     LIVER: The infiltrative centrally necrotic masses within the right and left hepatic lobes are unchanged compared to recent ultrasound and MRI accounting for differences in technique, with the largest component measuring 12.5 x 9.5 x 9.1 cm   sonographically. The main portal vein is patent with hepatopedal flow.    RIGHT KIDNEY: No hydronephrosis.    PANCREAS: The pancreas is largely obscured by overlying gas.    Trace ascites.      Impression    IMPRESSION:  1.  No sonographic findings of intra or extrahepatic biliary ductal dilation. Known biliary stents are not well visualized.  2.  Unchanged large heterogeneous bilobar liver metastases.   IR Lower Extremity Venogram Left    Narrative    [facility]  LOCATION: [facility]  DATE: 4/6/2024    PROCEDURE:   1.  LEFT LOWER EXTREMITY DIAGNOSTIC VENOGRAPHY.  2.  INFERIOR VENACAVOGRAPHY.  3.  MECHANICAL THROMBECTOMY OF THE LEFT COMMON FEMORAL AND SUPERFICIAL FEMORAL VEINS (EXTIRPATION OF MATTER).  4.  ULTRASOUND GUIDANCE FOR VASCULAR ACCESS.  5.  MODERATE SEDATION.    INTERVENTIONAL RADIOLOGIST: Delgado Barth M.D.    INDICATION: 68-year-old male with history of pancreatic cancer and previous DVT with recent onset left lower  extremity swelling who was found to have recurrent left lower extremity DVT.    CONSENT: The risks, benefits and alternatives of the stated procedure were discussed with the patient  in detail. All questions were answered. Informed consent was given to proceed with the procedure.    MODERATE SEDATION: None. 50 mcg IV fentanyl ministered for analgesia.    CONTRAST: 30 mL Omni 350  ANTIBIOTICS: None.  ADDITIONAL MEDICATIONS: 3000 units IV heparin, 50 mg IV Benadryl    FLUOROSCOPIC TIME: 4.1 minutes.  RADIATION DOSE: Air Kerma: 165 mGy.    COMPLICATIONS: No immediate complications.    STERILE BARRIER TECHNIQUE: Maximum sterile barrier technique was used. Cutaneous antisepsis was performed at the operative site with application of 2% chlorhexidine and large sterile drape. Prior to the procedure, the  and assistant performed   hand hygiene and wore hat, mask, sterile gown, and sterile gloves during the entire procedure.    PROCEDURE:  The procedure, including the risks, benefits, and alternatives to the procedure itself were discussed with the patient in the patient's family members. When all of their questions were answered informed written and verbal consent was   obtained. The patient was then brought to the Interventional Radiology suite, placed in the prone position, and both of the patient's popliteal fossa was sterilely prepped and draped. The left popliteal vein was noted to be ultrasound noncompressible.   After giving local anesthesia with lidocaine, the left popliteal vein was punctured with a 21 gauge needle, under ultrasound guidance with a permanent image stored. A 0.018 inch wire advanced through the needle into the superficial femoral vein under   fluoroscopic guidance. The needle was then exchanged over the wire for a 4 Vincentian coaxial dilator. The inner 3 Vincentian dilator and 0.018 inch wire were then exchanged for a 0.035 inch guidewire. The outer 4 Vincentian dilator was then exchanged over the    guidewire for a 14 Israeli dilator. Dilatation was performed over the Amplatz wire. Next a 13 Israeli Inari ClotTriever sheath was placed over the wire.     A 5 Israeli KMP catheter was advanced through the sheath and digital subtraction angiography was performed throughout the left lower extremity in stations. Over a 0.035 inch angled Glidewire the KMP catheter was directed into the inferior vena cava and an   inferior venacavogram was performed. The KMP catheter and Glidewire were advanced into the superior vena cava. The Glidewire was exchanged for a 0.035 inch Amplatz wire.    Next mechanical thrombectomy was performed with the ClotTriever device (extirpation of matter). After 4 passes repeat digital subtraction venography was obtained. Venography was performed throughout the iliofemoral veins. The visualized left lower   extremity veins remained patent with in-line flow.     The catheter was removed. The sheath was removed with hemostasis achieved via a combination of a purse string suture as well as manual compression.      FINDINGS:  1.  Ultrasound demonstrates an occlusive left popliteal vein. A permanent image was stored.  2.  The initial digital subtraction left lower extremity venography demonstrates DVT within the left superficial femoral and common femoral veins.  3.  The inferior vena cava is widely patent.  4.  Images obtained during the initial mechanical thrombectomy show the Inari device in place.  Repeat venography shows debulking of the acute and chronic thrombotic burden with patency of the left iliofemoral veins.  5.  Following thrombectomy was no evidence of residual/persistent thrombus or stenosis.      Impression    IMPRESSION:    1.  Left lower extremity occlusive deep venous thrombosis involving the left superficial and common femoral veins.  2.  Successful mechanical thrombectomy (extirpation of matter), with a significantly improved angiographic appearance.     PLAN:  It is recommended  that the patient remain on anticoagulation as an outpatient for at least 3 months, if possible given the patient's age and other comorbidities.                Discussed with patient, family, GI, nursing staff and discharge planner    Anthony Orosco MD  [facility] Medicine Service  [phone]

## 2024-04-06 NOTE — PLAN OF CARE
Problem: Adult Inpatient Plan of Care  Goal: Plan of Care Review  Description: The Plan of Care Review/Shift note should be completed every shift.  The Outcome Evaluation is a brief statement about your assessment that the patient is improving, declining, or no change.  This information will be displayed automatically on your shift  note.  Outcome: Progressing   Goal Outcome Evaluation:         Pt alert and oriented x4, bedrest, heparin infusing 1800 units/hr, NS infusing 75/hr, denies pain, uses urinal and bedpan, slept between cares

## 2024-04-06 NOTE — PRE-PROCEDURE
GENERAL PRE-PROCEDURE:   Procedure:  LLE venogram with probable thrombectomy  Date/Time:  4/6/2024 8:58 AM    Verbal consent obtained?: Yes    Written consent obtained?: Yes    Risks and benefits: Risks, benefits and alternatives were discussed    Consent given by:  Patient  Patient states understanding of procedure being performed: Yes    Patient's understanding of procedure matches consent: Yes    Procedure consent matches procedure scheduled: Yes    Expected level of sedation:  Moderate  Appropriately NPO:  Yes  ASA Class:  3  Mallampati  :  Grade 2- soft palate, base of uvula, tonsillar pillars, and portion of posterior pharyngeal wall visible  Lungs:  Lungs clear with good breath sounds bilaterally  Heart:  Normal heart sounds and rate  History & Physical reviewed:  History and physical reviewed and no updates needed  Statement of review:  I have reviewed the lab findings, diagnostic data, medications, and the plan for sedation

## 2024-04-06 NOTE — SEDATION DOCUMENTATION
Patient Name: Obi Lawrence  Medical Record Number: [id number]  Today's Date: 4/6/2024    Procedure: left leg thrombectomy  Proceduralist: Dr. Barth      Sedation medications administere0 mg midazolam and 50 mcg fentanyl   Sedation time: 30 minutes  Benadryl 50 mg IV  Heparin 3000 units IV  Report given to: ER RN

## 2024-04-06 NOTE — INTERVAL H&P NOTE
"I have reviewed the surgical (or preoperative) H&P that is linked to this encounter, and examined the patient. There are no significant changes    Clinical Conditions Present on Arrival:  Clinically Significant Risk Factors Present on Admission         # Hyponatremia: Lowest Na = 128 mmol/L in last 2 days, will monitor as appropriate      # Hypoalbuminemia: Lowest albumin = 2.2 g/dL at 4/6/2024  6:35 AM, will monitor as appropriate   # Drug Induced Coagulation Defect: home medication list includes an anticoagulant medication  # Thrombocytopenia: Lowest platelets = 93 in last 2 days, will monitor for bleeding  # Overweight: Estimated body mass index is 26.73 kg/m  as calculated from the following:    Height as of this encounter: 1.753 m (5' 9\").    Weight as of this encounter: 82.1 kg (181 lb).       "

## 2024-04-06 NOTE — CONSULTS
Care Management Initial Consult    General Information  Assessment completed with: Patient, Spouse or significant other, patient, spouse Lora  Type of CM/SW Visit: Initial Assessment    Primary Care Provider verified and updated as needed:     Readmission within the last 30 days: no previous admission in last 30 days      Reason for Consult: discharge planning  Advance Care Planning: Advance Care Planning Reviewed: no concerns identified          Communication Assessment  Patient's communication style: spoken language (English or Bilingual)             Cognitive  Cognitive/Neuro/Behavioral: WDL                      Living Environment:   People in home: spouse  patient, Lora  Current living Arrangements: house      Able to return to prior arrangements: yes       Family/Social Support:  Care provided by: self, spouse/significant other  Provides care for: no one  Marital Status:   Wife, Children  Lora       Description of Support System: Supportive, Involved    Support Assessment: Patient communicates needs well met, Adequate family and caregiver support, Adequate social supports    Current Resources:   Patient receiving home care services: No     Community Resources: OP Infusion  Equipment currently used at home:    Supplies currently used at home: None    Employment/Financial:  Employment Status: retired        Financial Concerns:             Does the patient's insurance plan have a 3 day qualifying hospital stay waiver?  No    Lifestyle & Psychosocial Needs:  Social Determinants of Health     Food Insecurity: Not on file   Depression: Not at risk (3/11/2024)    PHQ-2     PHQ-2 Score: 0   Housing Stability: Not on file   Tobacco Use: Low Risk  (4/3/2024)    Patient History     Smoking Tobacco Use: Never     Smokeless Tobacco Use: Never     Passive Exposure: Past   Financial Resource Strain: Not on file   Alcohol Use: Not on file   Transportation Needs: Not on file   Physical Activity: Not on file    Interpersonal Safety: Not on file   Stress: Not on file   Social Connections: Not on file       Functional Status:  Prior to admission patient needed assistance:   Dependent ADLs:: Independent  Dependent IADLs:: Transportation, Cleaning, Laundry       Mental Health Status:          Chemical Dependency Status:                Values/Beliefs:  Spiritual, Cultural Beliefs, Temple Practices, Values that affect care:                 Additional Information:  Met with patient and spouse Lora at the bedside for initial assessment. Introduced self and care management role. Patient lives with spouse in their home. He is independent with his ADLs and spouse assists with his IADLs including transportation. He uses no DME.    Pancreatic cancer last August, scheduled to start chemo on 4/9.     CM to follow hospital progression, treatment team recommendations and assist with discharge planning as needed. Spouse to transport.    Aruna Alberts RN

## 2024-04-06 NOTE — ED NOTES
Bleeding noticed at previous IV site at right AC. It took sometime to stop bleeding. Provider was updated. Heparin drip paused per provider. Pt transferred to P2 at 1315. Report given to receiving RN about heparin pause and other pt cares.

## 2024-04-06 NOTE — PLAN OF CARE
"  Problem: VTE (Venous Thromboembolism)  Goal: Tissue Perfusion  Outcome: Progressing     Problem: Risk for Delirium  Goal: Improved Sleep  Outcome: Progressing   Goal Outcome Evaluation:             Pt stated left LE causing some discomfort, rated at a \"1\". Leg has swelling +4 edema. On heparin gtt. Pulses are strong and easy to palpate. Pt has clear liquid diet. Wife and daughter here visiting. Pt declined pain meds. Used bedpan x 2.            "

## 2024-04-07 NOTE — PROGRESS NOTES
[facility]    PROGRESS NOTE - Hospitalist Service    Assessment and Plan  68 year old male with a past medical history of metastatic pancreatic cancer,  DVT, anemia, sepsis, anticoagulated on Lovenox who presents to the ED for evaluation of leg swelling and pain.  Admitted with extensive DVT with plan of thrombectomy.     Extensive left lower arm DVT  - History of DVT on 2/2024 and patient has been on Lovenox.  - Lovenox has been on hold for recent biliary stent  - Venous ultrasound showed Extensive left lower extremity deep venous thrombosis extending from the common femoral vein through the visualized proximal profunda femoris, femoral vein, popliteal vein, and calf veins.   - Start IV heparin  - IR consult, appreciate input   - S/p thrombectomy on 4/6/2024.  -Postprocedure orders as per IR  -Discontinue IV heparin today and transition to subcutaneous Lovenox     Hyperbilirubinemia   - Secondary to metastatic pancreatic cancer  - S/P biliary stent 1/2/2024  - Bilirubin is increasing after stent placement  - GI consult, appreciate input-   - CT abdomen as per GI shows increasing mass of pancreas and liver mets, stable biliary stent in place     Metastatic pancreatic adenocarcinoma  - S/p palliative chemotherapy  - Follow-up with oncology as outpatient     Chronic pain syndrome  - Secondary to migrated cancer  - Resume home Dilaudid  - Add IV Dilaudid as needed     Leukocytosis  -Etiology is unclear, steroid versus infection  -Patient is immunocompromised with cancer treatment.  -Slight elevated procalcitonin  -Started Zosyn empirically.  Plan to transition to antibiotic tomorrow  -Continue to monitor CBC     Hyponatremia  - Combination of dehydration versus SIADH  - Start IV fluid hydration  - Continue to monitor sodium level.  -Improving, discontinue IV fluid    Bilateral leg swelling  -PT/OT lymphedema       35 MINUTES SPENT BY ME on the date of service doing chart review, history, exam,  documentation & further activities per the note    Principal Problem:    Hyperbilirubinemia  Active Problems:    Acute deep vein thrombosis (DVT) of femoral vein of both lower extremities (H)    Metastasis from pancreatic cancer (H)      VTE prophylaxis:  Enoxaparin (Lovenox) SQ  DIET: Orders Placed This Encounter      Regular Diet Adult      Disposition/Barriers to discharge: IV antibiotic, transition to subcu Lovenox and monitor LFTs  Code Status: Full Code    Subjective:  Obi is feeling much better today, denies any chest pain or shortness of breath, has some weakness.    PHYSICAL EXAM  Vitals:    04/05/24 1127   Weight: 82.1 kg (181 lb)     B/P:92/53 T:98 P:84 R:18     Intake/Output Summary (Last 24 hours) at 4/7/2024 1538  Last data filed at 4/7/2024 0823  Gross per 24 hour   Intake 2107 ml   Output 750 ml   Net 1357 ml      Body mass index is 26.73 kg/m .    Constitutional: awake, alert, cooperative, no apparent distress, and appears stated age  Respiratory: No increased work of breathing, good air exchange, clear to auscultation bilaterally, no crackles or wheezing  Cardiovascular: Normal apical impulse, regular rate and rhythm, normal S1 and S2, no S3 or S4, and no murmur noted  GI: No scars, normal bowel sounds, soft, non-distended, non-tender, no masses palpated, no hepatosplenomegally  Skin: Positive jaundice  Musculoskeletal: Bilateral +2 legs edema more on left side.  Neurologic: Awake, alert, oriented to name, place and time.  Cranial nerves II-XII are grossly intact.  Motor is 5 out of 5 bilaterally.   Sensory is intact.    Neuropsychiatric: Appropriate with examiner      PERTINENT LABS/IMAGING:    I have personally reviewed the following data over the past 24 hrs:    40.0 (H)  \   8.2 (L)   / 109 (L)     131 (L) 100 38.2 (H) /  171 (H)   4.0 22 0.70 \     ALT: 56 AST: 84 (H) AP: 1,553 (H) TBILI: 13.2 (H)   ALB: 2.2 (L) TOT PROTEIN: 4.5 (L) LIPASE: N/A     TSH: N/A T4: N/A A1C: 5.1     Procal:  0.73 (H) CRP: N/A Lactic Acid: N/A         Imaging results reviewed over the past 24 hrs:   No results found for this or any previous visit (from the past 24 hour(s)).    Discussed with patient, family, GI, nursing staff and discharge planner    Anthony Orosco MD  [facility] Medicine Service  [phone]

## 2024-04-07 NOTE — PROGRESS NOTES
"  Interventional Radiology - Progress Note  Inpatient - [facility]: Interventional Radiology   [phone]  4/7/2024    S:  Jerel is doing ok this morning. Endorses worsening b/l leg swelling since admission, presumably from IV fluids which have now been stopped. Denies leg pain, numbness, tingling. Denies chest pain/pressure, SOB, nausea, vomiting, fever.     O:  /73 (BP Location: Left arm)   Pulse 64   Temp 97.4  F (36.3  C) (Axillary)   Resp 18   Ht 1.753 m (5' 9\")   Wt 82.1 kg (181 lb)   SpO2 95%   BMI 26.73 kg/m    General: Pleasant manseen resting in bed. Appears stable, comfortable, in no acute distress.   Neuro: Alert, oriented, speech clear. Appropriately interactive.   Resp: Non-labored breathing on room air.  Cardio/MSK: 2+  BLE pitting edema.  Vascular: Bilateral dorsalis pedis pulses difficult to palpate d/t edema but present. Extremities warm.   Left popliteal puncture site with old blood on dressing, no new active bleeding, redness, or swelling. Dressing and purse string suture removed at bedside. Scant bleeding resolved with manual pressure. New dressing applied.  Skin: Pale, warm, dry.     IMAGING:  Results for orders placed or performed during the hospital encounter of 04/05/24   XR Chest Port 1 View    Narrative    EXAM: XR CHEST PORT 1 VIEW  LOCATION: [facility]  DATE: 4/5/2024    INDICATION: Dyspnea  COMPARISON: CT chest, abdomen and pelvis 03/23/2024      Impression    IMPRESSION: Left chest port catheter tip terminates at the superior cavoatrial junction. Shallow inspiration with left greater than right lower lobe atelectasis. Suspected pulmonary metastases are better seen on recent CT. Trace left pleural effusion. No   pneumothorax. Stable heart size and mediastinal contours.   US Lower Extremity Venous Duplex Bilateral     Value    Radiologist flags DVT (AA)    Narrative    EXAM: US LOWER EXTREMITY VENOUS DUPLEX BILATERAL  LOCATION: [facility]  DATE: 4/5/2024    INDICATION: Patient with bilateral lower extremity swelling anasarca, history of left sided DVT with interruption of anticoagulation for EGD  COMPARISON: 02/09/2024  TECHNIQUE: Venous Duplex ultrasound of bilateral lower extremities with and without compression, augmentation and duplex. Color flow and spectral Doppler with waveform analysis performed.    FINDINGS: Exam includes the common femoral, femoral, popliteal veins as well as segmentally visualized deep calf veins and greater saphenous vein.     RIGHT: There is occlusive thrombus within the right posterior tibial and one of the paired peroneal veins. No extension into the popliteal vein or additional DVT. No popliteal cyst.     LEFT: Extensive deep venous thrombosis throughout the left lower extremity deep venous system, including occlusive thrombus in the common femoral vein extending into the visualized profunda femoris as well as into the femoral, popliteal, posterior   tibial, and peroneal veins.  No popliteal cyst.      Impression    IMPRESSION:  1.  Extensive left lower extremity deep venous thrombosis extending from the common femoral vein through the visualized proximal profunda femoris, femoral vein, popliteal vein, and calf veins.  2.  Thrombus within the right posterior tibial and one of the paired peroneal veins in the right calf.      [Critical Result: DVT]    Finding was identified on 4/5/2024 2:43 PM CDT.     Dr. Clayton was contacted by me on 4/5/2024 2:52 PM CDT and verbalized understanding of the critical result.   US Abdomen Limited (RUQ)    Narrative    EXAM: US ABDOMEN LIMITED  LOCATION: [facility]  DATE: 4/5/2024    INDICATION: Increasing bilirubin, recent biliary stent placement  COMPARISON: Abdominal ultrasound 03/28/2024, MRI 03/26/2024  TECHNIQUE: Limited abdominal ultrasound.    FINDINGS:    GALLBLADDER: The gallbladder is contracted. No shadowing  gallstones.    BILE DUCTS: No intrahepatic biliary ductal dilation. The common bile duct measures up to 4 mm. Known biliary stents are not well visualized.     LIVER: The infiltrative centrally necrotic masses within the right and left hepatic lobes are unchanged compared to recent ultrasound and MRI accounting for differences in technique, with the largest component measuring 12.5 x 9.5 x 9.1 cm   sonographically. The main portal vein is patent with hepatopedal flow.    RIGHT KIDNEY: No hydronephrosis.    PANCREAS: The pancreas is largely obscured by overlying gas.    Trace ascites.      Impression    IMPRESSION:  1.  No sonographic findings of intra or extrahepatic biliary ductal dilation. Known biliary stents are not well visualized.  2.  Unchanged large heterogeneous bilobar liver metastases.   IR Lower Extremity Venogram Left    [facility] RADIOLOGY  LOCATION: [facility]  DATE: 4/6/2024    PROCEDURE:   1.  LEFT LOWER EXTREMITY DIAGNOSTIC VENOGRAPHY.  2.  INFERIOR VENACAVOGRAPHY.  3.  MECHANICAL THROMBECTOMY OF THE LEFT COMMON FEMORAL AND SUPERFICIAL FEMORAL VEINS (EXTIRPATION OF MATTER).  4.  ULTRASOUND GUIDANCE FOR VASCULAR ACCESS.  5.  MODERATE SEDATION.    INTERVENTIONAL RADIOLOGIST: Delgado Barth M.D.    INDICATION: 68-year-old male with history of pancreatic cancer and previous DVT with recent onset left lower extremity swelling who was found to have recurrent left lower extremity DVT.    CONSENT: The risks, benefits and alternatives of the stated procedure were discussed with the patient  in detail. All questions were answered. Informed consent was given to proceed with the procedure.    MODERATE SEDATION: None. 50 mcg IV fentanyl ministered for analgesia.    CONTRAST: 30 mL Omni 350  ANTIBIOTICS: None.  ADDITIONAL MEDICATIONS: 3000 units IV heparin, 50 mg IV Benadryl    FLUOROSCOPIC TIME: 4.1 minutes.  RADIATION DOSE: Air Kerma: 165 mGy.    COMPLICATIONS: No immediate  complications.    STERILE BARRIER TECHNIQUE: Maximum sterile barrier technique was used. Cutaneous antisepsis was performed at the operative site with application of 2% chlorhexidine and large sterile drape. Prior to the procedure, the  and assistant performed   hand hygiene and wore hat, mask, sterile gown, and sterile gloves during the entire procedure.    PROCEDURE:  The procedure, including the risks, benefits, and alternatives to the procedure itself were discussed with the patient in the patient's family members. When all of their questions were answered informed written and verbal consent was   obtained. The patient was then brought to the Interventional Radiology suite, placed in the prone position, and both of the patient's popliteal fossa was sterilely prepped and draped. The left popliteal vein was noted to be ultrasound noncompressible.   After giving local anesthesia with lidocaine, the left popliteal vein was punctured with a 21 gauge needle, under ultrasound guidance with a permanent image stored. A 0.018 inch wire advanced through the needle into the superficial femoral vein under   fluoroscopic guidance. The needle was then exchanged over the wire for a 4 Turkmen coaxial dilator. The inner 3 Turkmen dilator and 0.018 inch wire were then exchanged for a 0.035 inch guidewire. The outer 4 Turkmen dilator was then exchanged over the   guidewire for a 14 Turkmen dilator. Dilatation was performed over the Amplatz wire. Next a 13 Turkmen Inari ClotTriever sheath was placed over the wire.     A 5 Turkmen KMP catheter was advanced through the sheath and digital subtraction angiography was performed throughout the left lower extremity in stations. Over a 0.035 inch angled Glidewire the KMP catheter was directed into the inferior vena cava and an   inferior venacavogram was performed. The KMP catheter and Glidewire were advanced into the superior vena cava. The Glidewire was exchanged for a 0.035 inch  Amplatz wire.    Next mechanical thrombectomy was performed with the ClotTriever device (extirpation of matter). After 4 passes repeat digital subtraction venography was obtained. Venography was performed throughout the iliofemoral veins. The visualized left lower   extremity veins remained patent with in-line flow.     The catheter was removed. The sheath was removed with hemostasis achieved via a combination of a purse string suture as well as manual compression.      FINDINGS:  1.  Ultrasound demonstrates an occlusive left popliteal vein. A permanent image was stored.  2.  The initial digital subtraction left lower extremity venography demonstrates DVT within the left superficial femoral and common femoral veins.  3.  The inferior vena cava is widely patent.  4.  Images obtained during the initial mechanical thrombectomy show the Inari device in place.  Repeat venography shows debulking of the acute and chronic thrombotic burden with patency of the left iliofemoral veins.  5.  Following thrombectomy was no evidence of residual/persistent thrombus or stenosis.      Impression    IMPRESSION:    1.  Left lower extremity occlusive deep venous thrombosis involving the left superficial and common femoral veins.  2.  Successful mechanical thrombectomy (extirpation of matter), with a significantly improved angiographic appearance.     PLAN:  It is recommended that the patient remain on anticoagulation as an outpatient for at least 3 months, if possible given the patient's age and other comorbidities.            CT Abdomen Pelvis w/o Contrast    Narrative    EXAM: CT ABDOMEN PELVIS W/O CONTRAST  LOCATION: [facility]  DATE: 4/6/2024    INDICATION: Rising liver function tests with history of metastatic pancreatic adenocarcinoma.  COMPARISON: 03/23/2024.  TECHNIQUE: CT scan of the abdomen and pelvis was performed without IV contrast. Multiplanar reformats were obtained. Dose reduction techniques were  used.  CONTRAST: None.    FINDINGS:   LOWER CHEST: Bandlike basilar opacities, probably atelectasis. Tiny left pleural effusion. Incompletely seen central catheter tip in the low right atrium at the inferior cavoatrial junction. Hypoattenuating cardiac blood pool suggesting anemia.    HEPATOBILIARY: Increasing size of dominant hepatic mass measuring 8.3 cm versus 7.6 cm (series 3, image 48). Smaller liver masses show no significant change. Intrahepatic and common bile duct stenting now present. Biliary dilatation not well evaluated on   this noncontrast exam.    PANCREAS: No new or enlarging findings.    SPLEEN: Normal.    ADRENAL GLANDS: Normal.    KIDNEYS/BLADDER: Contrast in the renal collecting systems and bladder from recent interventional radiology venogram. Otherwise, unremarkable.    BOWEL: Gastric fundus diverticulum. Colonic diverticulosis without diverticulitis. No bowel obstruction.    LYMPH NODES: No new or enlarging adenopathy.    VASCULATURE: Minimal air along the left common femoral vein from recent procedure.    PELVIC ORGANS: Mild ascites.    MUSCULOSKELETAL: Body wall subcutaneous edema.      Impression    IMPRESSION:     1.  Mildly increased size of the largest liver lesion since 03/23/2024. Other liver lesions show no significant change.    2.  Interval intra- and extrahepatic biliary stenting.     3.  Mild ascites. No free air.    4.  Other findings in the report.       LABS:  CBC  Recent Labs   Lab 04/07/24  0706 04/06/24  0635 04/05/24  1452 04/05/24  1150   WBC 40.0* 40.6* 43.0* 40.2*   RBC 2.61* 2.94* 3.41* 3.32*   HGB 8.2* 9.2* 10.6* 10.2*   HCT 25.5* 28.0* 33.2* 31.7*   MCV 98 95 97 96   MCH 31.4 31.3 31.1 30.7   MCHC 32.2 32.9 31.9 32.2   RDW 22.6* 22.5* 22.5* 22.4*   * 101* 93* 133*     CMP  Recent Labs   Lab 04/07/24  0949 04/07/24  0706 04/06/24  0635 04/05/24  1150 04/02/24  1015   NA  --  131* 128* 129* 130*   POTASSIUM  --  4.0 4.4 4.3 3.5   CHLORIDE  --  100 96* 97* 96*    CO2  --  22 24 21* 21*   ANIONGAP  --  9 8 11 13   * 246* 185* 179* 128*   BUN  --  38.2* 33.0* 37.7* 21.9   CR  --  0.70 0.58* 0.77 0.66*   GFRESTIMATED  --  >90 >90 >90 >90   TREVOR  --  8.8 9.3 9.8 9.7   MAG  --   --   --  2.1  --    PROTTOTAL  --  4.5* 4.8* 5.2* 5.7*   ALBUMIN  --  2.2* 2.2* 2.4* 2.6*   BILITOTAL  --  13.2* 13.6* 15.0* 11.2*   ALKPHOS  --  1,553* 1,552* 1,672* 1,299*   AST  --  84* 105* 125* 106*   ALT  --  56 60 70 58     INR  Recent Labs   Lab 04/05/24  1150 04/02/24  1015   INR 2.93* 2.78*           A:  Obi Lawrence is a 68 year old man with PMHx metastatic pancreatic cancer, DVT, anemia, sepsis, anticoagulated on Lovenox who presented to the ED for evaluation of left leg swelling and pain. Admitted with extensive LLE DVT. IR consulted for intervention.     S/p IR LLE venogram with successful mechanical thrombectomy on 4/6/2024.     P:    - Procedure site suture successfully removed at bedside without immediate complications or additional blood loss. New dressing applied to site.  - Continue to monitor procedure site carefully for bleeding, hematoma, redness or swelling, especially as patient increases activity. If re-bleeding occurs, recommend sustained direct firm pressure to site. Could consider D stat, absorbable hemostat, or wrap area for compressive effect if site re-bleeds and does not resolve with direct hand pressure.  - OK to transition to oral anticoagulation per primary team or Hematology recommendations. Recommend anticoagulation for a minimum of 3 months if possible depending on comorbidities.  - Post venogram/mechanical thrombectomy cares discussed with patient. Questions answered.   - Post venogram/mechanical thrombectomy D/C instructions entered into discharge AVS.   - OK to discharge from IR procedural standpoint once patient has successfully transitioned to oral anticoagulation and once cleared by other services.  - Please contact IR with questions or  concerns.        Total time spent on the date of the encounter is 35 minutes, including time spent counseling the patient, performing a medically appropriate evaluation, reviewing prior medical history, ordering medications and tests, documenting clinical information in the medical record, and communication of results.    Margie Sorto DNP, APRN, NP-C  Interventional Radiology  [phone] (Sat/Sun 8am-12pm)      E/M codes for reference only:  77491

## 2024-04-07 NOTE — DISCHARGE INSTRUCTIONS
Mechanical Thrombectomy Procedure:  This procedure is used to help when people have a bloodclot(s) blocking one or more of the large blood vessels within the body. It involves inserting a catheter (thin flexible tube) with a device into the blood stream so that clot within blood vessel can be removed. Thistreatment can reduce the long-term effects of clot burden. Please follow the below instructions as you recover:    Care instructions after angiogram procedure:  -  If you received sedation for yourprocedure, do not drive or operate heavy machinery for the rest of the day.  -  Do not lift objects greater than 10 pounds for 3 days following the procedure.  -  Avoid excessive exercise and straining for 3 days.    Avoid tub baths, pools, hot tubs and Jacuzzis for 3 days or until procedure site is well healed.   -  You may shower beginning tomorrow. Do not scrub procedure site until well healed; pat dry.  -  Return to yournormal activities as you tolerate after the 3 day restriction.  -  You can expect to return to work 1-3 days after your procedure - depending on the nature of your profession.  -  It is normal to have sometenderness and minimal swelling at procedure puncture site. A small area of discoloration may be present. Tenderness typically subsides in 1-2 days. A small knot may also be present at puncture site for 6-8 weeks. This canbe a normal part of the healing process.     Please seek medical evaluation for:  - If you develop fevers (greater than 101 F (38.3C)).  - If you develop increasing pain, redness, purulent drainage,tenderness, or swelling at procedure site.   - If you experience any bleeding from procedure/puncture site: lie down, firmly apply pressure to puncture site and call 911.  - Seek emergent evaluation if you experienceany new leg/arm pain, discoloration or numbness.  - Increasing shortness of breath.  - Increasing swelling or pain within your arms/legs.    Call [place] IR RN Line at  [phone] with procedure-related questions or concerns.          Lymphedema Therapy Instructions for Discharge:    Apply short stretch compression bandages in the following sequence, if tolerated patient can wear for 24-48 hours between changes.   1. Wash legs thoroughly with mild soap, then apply lotion or Aquaphor (or like product).  2. Pull on stockinette, additional length can be left near toes and above knee to roll back over bandages once applied.   3. Start with the 8cm brown bandage (smaller bandage). From the base of the toes, anchor around foot 2-3 times and continue to spiral up the foot and leg, ending above the ankle. Use tape to secure bandage end.   5. Next, with the 12-cm wide brown bandage (bigger bandage), start at the ankle. Spiral the bandage up to knee. Use tape to secure bandage end.   6. Finish by folding the stockinette over bandage at the toe and knee ends to help secure wraps.     Compression should be removed if pain, numbness/tingling, or if soiled.     Care for bandages: Brown bandages can be hand washed every 7-10 days with mild soap or detergent, hang flat to dry, getting out as many wrinkles as possible.  Lay flat if possible to avoid stretching.  When rolling bandages back up after dry, try to roll them up as tight as possible for easier reapplication.     To view the [employer] video with instructions and video on how to bandage legs:  1. Go to [email or web address]  2. Search edema bandaging for simple instructions.

## 2024-04-07 NOTE — PROGRESS NOTES
"[facility] Progress Note    Subjective: no abdominal pain. No complaints    Objective:  /73 (BP Location: Left arm)   Pulse 64   Temp 97.4  F (36.3  C) (Axillary)   Resp 18   Ht 1.753 m (5' 9\")   Wt 82.1 kg (181 lb)   SpO2 95%   BMI 26.73 kg/m     Gen: Awake, no acute distress  Gastrointestinal: soft, nt, nd      Patient Active Problem List   Diagnosis    Overlapping malignant neoplasm of pancreas (H)    Hyperbilirubinemia    Acute deep vein thrombosis (DVT) of femoral vein of both lower extremities (H)    Metastasis from pancreatic cancer (H)       Labs:  Recent Labs   Lab 04/07/24  0706 04/06/24  0635 04/05/24  1452   WBC 40.0* 40.6* 43.0*   RBC 2.61* 2.94* 3.41*   HGB 8.2* 9.2* 10.6*   HCT 25.5* 28.0* 33.2*   MCV 98 95 97   MCH 31.4 31.3 31.1   MCHC 32.2 32.9 31.9   RDW 22.6* 22.5* 22.5*   * 101* 93*      Recent Labs   Lab 04/07/24  0706 04/06/24  0635 04/05/24  1150   * 128* 129*   CO2 22 24 21*   BUN 38.2* 33.0* 37.7*     Recent Labs   Lab 04/07/24  0706 04/06/24  0635 04/05/24  1150   ALKPHOS 1,553* 1,552* 1,672*   AST 84* 105* 125*   ALT 56 60 70     Lab Results   Component Value Date    INR 2.93 (H) 04/05/2024    INR 2.78 (H) 04/02/2024       Image(s):  Procedure Component Value Units Date/Time   CT Abdomen Pelvis w/o Contrast [[id number]] Collected: 04/06/24 1227   Order Status: Completed Updated: 04/06/24 1317   Narrative:     EXAM: CT ABDOMEN PELVIS W/O CONTRAST  LOCATION: [facility]  DATE: 4/6/2024    INDICATION: Rising liver function tests with history of metastatic pancreatic adenocarcinoma.  COMPARISON: 03/23/2024.  TECHNIQUE: CT scan of the abdomen and pelvis was performed without IV contrast. Multiplanar reformats were obtained. Dose reduction techniques were used.  CONTRAST: None.    FINDINGS:  LOWER CHEST: Bandlike basilar opacities, probably atelectasis. Tiny left pleural effusion. Incompletely seen central catheter tip in the low right " atrium at the inferior cavoatrial junction. Hypoattenuating cardiac blood pool suggesting anemia.    HEPATOBILIARY: Increasing size of dominant hepatic mass measuring 8.3 cm versus 7.6 cm (series 3, image 48). Smaller liver masses show no significant change. Intrahepatic and common bile duct stenting now present. Biliary dilatation not well evaluated on   this noncontrast exam.    PANCREAS: No new or enlarging findings.    SPLEEN: Normal.    ADRENAL GLANDS: Normal.    KIDNEYS/BLADDER: Contrast in the renal collecting systems and bladder from recent interventional radiology venogram. Otherwise, unremarkable.    BOWEL: Gastric fundus diverticulum. Colonic diverticulosis without diverticulitis. No bowel obstruction.    LYMPH NODES: No new or enlarging adenopathy.    VASCULATURE: Minimal air along the left common femoral vein from recent procedure.    PELVIC ORGANS: Mild ascites.    MUSCULOSKELETAL: Body wall subcutaneous edema.   Impression:     IMPRESSION:    1.  Mildly increased size of the largest liver lesion since 03/23/2024. Other liver lesions show no significant change.    2.  Interval intra- and extrahepatic biliary stenting.    3.  Mild ascites. No free air.    4.  Other findings in the report.       Assessment:   Stage IV metastatic pancreatic cancer with mets to liver, lung, mediastinum, followed at   Increased lfts - s/p ercp at  4/2 w/ sphincterotomy and placement of 3 plastic stents; multifocal intrahepatic stenosis seen; noted rising wbc over last month, no clear evidence cholangitis; no desire dil on ultrasound, could be from known extensive mets to liver;CT shows stents and increase size liver lesion; feasibility of further biliary intervention should be discussed with  GI as outpatient  Extension of LLE DVT - s/p thrombectomy by IR 4/6, on heparin  4.   Leukocytosis - rising wbc over the last month, afebrile, no increase abdominal pain; unclear if this is 2/2 underlying malignancy or  infection    Plan:   Outpatient follow up with  GI   Outpatient Onc follow up  No plan for inpatient ERCP     Discussed with EVANS biliary MD.  Will sign off. Call with questions.   20 minutes of total time was spent providing patient care, including patient evaluation, reviewing documentation/tests, and .    Marissa Sharp MD  4/7/2024 11:42 AM  [facility]

## 2024-04-07 NOTE — PLAN OF CARE
Problem: VTE (Venous Thromboembolism)  Goal: Tissue Perfusion  4/7/2024 1758 by Héctor St, RN  Outcome: Progressing  4/7/2024 1731 by Héctor St RN  Outcome: Progressing     Problem: Oncology Care  Goal: Effective Coping  Outcome: Progressing    Pt denied pain or nausea throughout the day. Pt ambulated in hallways x2 and up to chair for lunch. Heparin infusion discontinued and Lovenox given per order, see MAR. Telemetry discontinued per order. Puncture site behind left knee with old drainage, dressing changed. VSS.    Héctor St, RN

## 2024-04-07 NOTE — PLAN OF CARE
[id number]    RN managed heparin infusion protocol and anti-xa monitoring throughout shift - infusing in L port-a-cath - patent, blood return noted. Lab draw and protocol adjustments implement per orders and MAR. Patient updated with changes. No evidence of bleeding. R PIV infusing mIVf and intermittent IV zosyn -  pillow provided to patient to minimize downstream occulusion alarms due to arm positioning while sleeping. .     Patient on RA. Denies pain. Education and assessments completed, per orders, on left popliteal venous sheath site on LLE - CMS and distal pulses unchanged throughout shift. Edema and reported tenderness, per patient's admission baseline.     Call light in reach and patient able to make needs known.     Regular diet - ice water refreshed per patient preference.     Unit collect - AM labs drawn and sent for processing. Cares clustered for patient and patient appears to be sleeping with even chest rise between cares.    Problem: Adult Inpatient Plan of Care  Goal: Plan of Care Review  Outcome: Progressing     Problem: Risk for Delirium  Goal: Improved Sleep  Outcome: Progressing     Problem: VTE (Venous Thromboembolism)  Goal: Tissue Perfusion  Outcome: Progressing

## 2024-04-07 NOTE — PROGRESS NOTES
Occupational Therapy      04/07/24 1300   Appointment Info   Signing Clinician's Name / Credentials (OT) Rhina Ro, OTR/L   Living Environment   People in Home spouse   Current Living Arrangements house   Home Accessibility stairs to enter home;stairs within home   Number of Stairs, Main Entrance 2   Number of Stairs, Within Home, Primary ten  (stairs to basement, patient can perform ADLs on main level)   Stair Railings, Within Home, Primary railings safe and in good condition   Living Environment Comments Patient states recently his spouse helps as needed wtih dressing and transfers.   Self-Care   Equipment Currently Used at Home walker, rolling   Fall history within last six months no   General Information   Onset of Illness/Injury or Date of Surgery 04/05/24   Referring Physician Anthony Orosco MD   Patient/Family Therapy Goal Statement (OT) get home   Additional Occupational Profile Info/Pertinent History of Current Problem 68 year old male with a past medical history of metastatic pancreatic cancer,  DVT, anemia, sepsis, anticoagulated on Lovenox who presents to the ED for evaluation of leg swelling and pain.  Admitted with extensive DVT with plan of thrombectomy.   Existing Precautions/Restrictions fall   Cognitive Status Examination   Orientation Status orientation to person, place and time   Range of Motion Comprehensive   General Range of Motion no range of motion deficits identified   Comment, General Range of Motion OT noted 3+ pitting edema in BLE-text page to MD to request lymph. orders   Strength Comprehensive (MMT)   General Manual Muscle Testing (MMT) Assessment no strength deficits identified   Comment, General Manual Muscle Testing (MMT) Assessment generalized weakness/fatigue   Bed Mobility   Bed Mobility sit-supine   Sit-Supine [place] (Bed Mobility) contact guard   Comment (Bed Mobility) Increased time for getting LE into bed   Transfers   Transfers sit-stand transfer   Sit-Stand  Transfer   Sit-Stand [place] (Transfers) minimum assist (75% patient effort)   Balance   Balance Comments Patient ambulated in room with CGA and FWW   Clinical Impression   Criteria for Skilled Therapeutic Interventions Met (OT) Yes, treatment indicated   OT Diagnosis Decreased ADL independence   OT Problem List-Impairments impacting ADL problems related to;activity tolerance impaired;balance   Assessment of Occupational Performance 1-3 Performance Deficits   Identified Performance Deficits endurance for ADLs, dressing, trsfs   Planned Therapy Interventions (OT) ADL retraining   Clinical Decision Making Complexity (OT) problem focused assessment/low complexity   Risk & Benefits of therapy have been explained evaluation/treatment results reviewed;care plan/treatment goals reviewed   OT Total Evaluation Time   OT Eval, Low Complexity Minutes (04081) 15   OT Goals   Therapy Frequency (OT) Daily   OT Predicted Duration/Target Date for Goal Attainment 04/14/24   OT Goals Transfers;Lower Body Dressing;Toilet Transfer/Toileting   OT: Lower Body Dressing Modified independent;using adaptive equipment   OT: Transfer Supervision/stand-by assist   OT: Toilet Transfer/Toileting Supervision/stand-by assist;toilet transfer;cleaning and garment management   Interventions   Interventions Quick Adds Therapeutic Procedures/Exercise   Therapeutic Procedures/Exercise   Therapeutic Procedure: strength, endurance, ROM, flexibillity minutes (00040) 10   Symptoms Noted During/After Treatment fatigue   Treatment Detail/Skilled Intervention Patient ambulated ~75 feet in hallway with CGA, 2 standing rest breaks.   OT Discharge Planning   OT Plan Bring AE for LE dressing, trsfs   OT Discharge Recommendation (DC Rec) home with home care occupational therapy   OT Rationale for DC Rec Patient may be close to baseline, spouse can assist at home as needed. May benefit from home care therapy services to improve ADL independence.   OT Brief  overview of current status Min A for trsfs, low endurance   Total Session Time   Timed Code Treatment Minutes 10   Total Session Time (sum of timed and untimed services) 25

## 2024-04-08 NOTE — PLAN OF CARE
Goal Outcome Evaluation:  Reports some lower back discomfort but declined medication intervention this morning. Repositioning helps to relieve.  Regular diet-Ate well for breakfast and is sitting up having lunch now. BG checks-no insulin needed at breakfast and just 1 Unit given with lunch.  IV bolus given for LA 2.6. VSS. WBC elevated but down from yesterday and BP soft at times.  On subcutaneous lovenox for bilateral DVTs. Wife has been administering at home so didn't feel further instruction was necessary.  Skin jaundice. Bili elevated.  Was up in the chair for about 3 hrs this morning but now back in bed.  IV Zosyn changed to PO Augmentin today.  Lymphedema wraps in place, applied this morning. Legs elevated in bed.

## 2024-04-08 NOTE — PROGRESS NOTES
Care Management Follow Up    Length of Stay (days): 3    Expected Discharge Date: 04/09/2024     Concerns to be Addressed: discharge planning     Patient plan of care discussed at interdisciplinary rounds: Yes    Anticipated Discharge Disposition:  home      Anticipated Discharge Services:  home care   Anticipated Discharge DME:  per treatment team     Patient/family educated on Medicare website which has current facility and service quality ratings:  NA  Education Provided on the Discharge Plan:  yes  Patient/Family in Agreement with the Plan:  yes    Referrals Placed by CM/SW:  skilled home care   Private pay costs discussed: Not applicable    Additional Information:  SW reviewed chart and discussed updates with MD.  Anticipate ready for discharge 4/9.  SW met with patient and spouse, Lora who was present in room.  SW introduced self and role.  Discussed recommendation for home care services.  Patient verbalizes agreement with referral to [facility] for PT, OT, HHA.     Patient's home care referral accepted by [facility]; howerver no PCP listed on chart. SW spoke with patient's wife who reports they recently moved from out of town and patient does not have a PCP.  Pt's wife agreeable to SW scheduling PCP appointment.      Appointment scheduled at [facility] for 4/16 at 1040.  Appointment details added to AVS.  JACEK updated [facility].     Social History:   Patient lives with spouse in their home. He is independent with his ADLs and spouse assists with his IADLs including transportation. He uses no DME.     FRANCOIS Marie

## 2024-04-08 NOTE — PROGRESS NOTES
04/08/24 0855   Appointment Info   Signing Clinician's Name / Credentials (PT) Marivel Valles PT   Living Environment   People in Home spouse   Current Living Arrangements house   Home Accessibility stairs to enter home;stairs within home   Number of Stairs, Main Entrance 2   Stair Railings, Main Entrance other (see comments)  (holds onto door frame and spouse assist)   Number of Stairs, Within Home, Primary greater than 10 stairs   Stair Railings, Within Home, Primary railings safe and in good condition   Transportation Anticipated family or friend will provide   Living Environment Comments lives on one level; doesn't need to complete stairs to bassement   Self-Care   Equipment Currently Used at Home walker, rolling   Activity/Exercise/Self-Care Comment independent amb. with FWW in home; has spouse assist with ADL/IADL's   General Information   Onset of Illness/Injury or Date of Surgery 04/05/24   Referring Physician Dr. Orosco   Patient/Family Therapy Goals Statement (PT) get up and walk   Pertinent History of Current Problem (include personal factors and/or comorbidities that impact the POC) hyperbilirubinemia; met. pancreatic CA, LLE DVT s/p thrombectomy 4/6   Existing Precautions/Restrictions no known precautions/restrictions   Cognition   Affect/Mental Status (Cognition) WFL   Orientation Status (Cognition) oriented x 4   Follows Commands (Cognition) WFL   Integumentary/Edema   Integumentary/Edema Comments BLe lymphedema wraps in place   Range of Motion (ROM)   Range of Motion ROM is WFL   Strength (Manual Muscle Testing)   Strength (Manual Muscle Testing) Deficits observed during functional mobility   Strength Comments generalized weakness BLE   Bed Mobility   Bed Mobility supine-sit   Supine-Sit [place] (Bed Mobility) modified independence   Assistive Device (Bed Mobility) bed rails   Comment, (Bed Mobility) HOB slightly elevated   Transfers   Transfers sit-stand transfer   Sit-Stand Transfer   Sit-Stand  [place] (Transfers) supervision;verbal cues;nonverbal cues (demo/gesture)   Assistive Device (Sit-Stand Transfers) walker, front-wheeled   Comment, (Sit-Stand Transfer) cuing for hand placement with FWW; one post. LOB into bed during first trial sit>Stand   Gait/Stairs (Locomotion)   [place] Level (Gait) supervision;verbal cues   Assistive Device (Gait) walker, front-wheeled   Distance in Feet (Gait) 150   Pattern (Gait) step-through   Deviations/Abnormal Patterns (Gait) trish decreased;gait speed decreased;stride length decreased;weight shifting decreased   Negotiation (Stairs) stairs independence;handrail location;number of steps;ascending technique;descending technique   [place] Level (Stairs) contact guard;verbal cues   Handrail Location (Stairs) both sides   Number of Steps (Stairs) 2   Ascending Technique (Stairs) step-to-step   Descending Technique (Stairs) step-to-step   Balance   Balance Comments one post. LOB during sit>Stand; sba static standing with BUE support   Sensory Examination   Sensory Perception Comments pt reports neuropathy B feet   Clinical Impression   Criteria for Skilled Therapeutic Intervention Yes, treatment indicated   PT Diagnosis (PT) impaired functional mobility   Influenced by the following impairments decreased strength, balance, skin integrity   Functional limitations due to impairments transfers, gait, bed mob., stairs   Clinical Presentation (PT Evaluation Complexity) evolving   Clinical Presentation Rationale pt presents as medically diagnosed   Clinical Decision Making (Complexity) low complexity   Planned Therapy Interventions (PT) balance training;bed mobility training;gait training;home exercise program;neuromuscular re-education;patient/family education;stair training;strengthening;transfer training   Risk & Benefits of therapy have been explained evaluation/treatment results reviewed;patient   PT Total Evaluation Time   PT Eval, Low Complexity Minutes  (87809) 12   Physical Therapy Goals   PT Frequency 5x/week   PT Predicted Duration/Target Date for Goal Attainment 04/15/24   PT Goals Bed Mobility;Transfers;Gait;Stairs;PT Goal 1   PT: Bed Mobility Independent;Supine to/from sit   PT: Transfers Modified independent;Sit to/from stand;Assistive device   PT: Gait Modified independent;Greater than 200 feet;Rolling walker   PT: Stairs Supervision/stand-by assist;2 stairs;Rail on right;Rail on left   PT: Goal 1 Pt will demonstrate HEP for LE ex with handout and supervision   Interventions   Interventions Quick Adds Therapeutic Procedure   Therapeutic Procedure/Exercise   Ther. Procedure: strength, endurance, ROM, flexibillity Minutes (96438) 8   Symptoms Noted During/After Treatment fatigue   Treatment Detail/Skilled Intervention issued handout of seated LE HEP and pt completed BLE ex x5-10 reps with sba, cuing and demonstration for technique   PT Discharge Planning   PT Plan gait with FWW, stairs, LE ex, bed mob, sit<>stand safety   PT Discharge Recommendation (DC Rec) home with assist;home with home care physical therapy   PT Rationale for DC Rec spouse assist with ADL/IADL's; continue with home PT for strengthening and lymphedema   PT Brief overview of current status amb. 150 feet with walker sba   Total Session Time   Timed Code Treatment Minutes 8   Total Session Time (sum of timed and untimed services) 20

## 2024-04-08 NOTE — PROGRESS NOTES
[facility]    PROGRESS NOTE - Hospitalist Service    Assessment and Plan  68 year old male with a past medical history of metastatic pancreatic cancer,  DVT, anemia, sepsis, anticoagulated on Lovenox who presents to the ED for evaluation of leg swelling and pain.  Admitted with extensive DVT with plan of thrombectomy.     Extensive left lower leg DVT  - History of DVT on 2/2024 and patient has been on Lovenox.  - Lovenox has been on hold for recent biliary stent  - Venous ultrasound showed Extensive left lower extremity deep venous thrombosis extending from the common femoral vein through the visualized proximal profunda femoris, femoral vein, popliteal vein, and calf veins.   - Start IV heparin  - IR consult, appreciate input   - S/p thrombectomy on 4/6/2024.  -Postprocedure orders as per IR  -Discontinue IV heparin today and transition to subcutaneous Lovenox     Hyperbilirubinemia   - Secondary to metastatic pancreatic cancer  - S/P biliary stent 1/2/2024  - Bilirubin is increasing after stent placement  - GI consult, appreciate input-   - CT abdomen as per GI shows increasing mass of pancreas and liver mets, stable biliary stent in place  -Continue to monitor LFTs     Metastatic pancreatic adenocarcinoma  - S/p palliative chemotherapy  - Follow-up with oncology as outpatient     Chronic pain syndrome  - Secondary to migrated cancer  - Resume home Dilaudid  - Add IV Dilaudid as needed     Leukocytosis  -Etiology is unclear, steroid versus infection versus cancer  -Patient is immunocompromised with cancer treatment.  -Slight elevated procalcitonin  -Started Zosyn empirically.    - transition to antibiotic today   -Continue to monitor CBC     Hyponatremia  - Combination of dehydration versus SIADH  - Start IV fluid hydration  - Continue to monitor sodium level.  -Improving, discontinue IV fluid    Lactic acidosis  -Elevated lactic acid this morning  -No symptoms of infection   - IV fluid  and repeat lactic acid level     Bilateral leg swelling  -PT/OT lymphedema  -Continue legs wrapped     35 MINUTES SPENT BY ME on the date of service doing chart review, history, exam, documentation & further activities per the note    Principal Problem:    Hyperbilirubinemia  Active Problems:    Acute deep vein thrombosis (DVT) of femoral vein of both lower extremities (H)    Metastasis from pancreatic cancer (H)      VTE prophylaxis:  Enoxaparin (Lovenox) SQ  DIET: Orders Placed This Encounter      Regular Diet Adult      Disposition/Barriers to discharge: Monitor white blood cells and lactic acid.  Code Status: Full Code    Subjective:  Obi is feeling better today, no acute significant events overnight.    PHYSICAL EXAM  Vitals:    04/05/24 1127   Weight: 82.1 kg (181 lb)     B/P:109/79 T:97.8 P:110 R:18     Intake/Output Summary (Last 24 hours) at 4/8/2024 1215  Last data filed at 4/8/2024 0820  Gross per 24 hour   Intake 250 ml   Output --   Net 250 ml      Body mass index is 26.73 kg/m .    Constitutional: awake, alert, cooperative, no apparent distress, and appears stated age  Respiratory: No increased work of breathing, good air exchange, clear to auscultation bilaterally, no crackles or wheezing  Cardiovascular: Normal apical impulse, regular rate and rhythm, normal S1 and S2, no S3 or S4, and no murmur noted  GI: No scars, normal bowel sounds, soft, non-distended, non-tender, no masses palpated, no hepatosplenomegally  Skin: no bruising or bleeding and normal skin color, texture, turgor  Musculoskeletal: Bilateral +2 legs edema.  Neurologic: Awake, alert, oriented to name, place and time.  Cranial nerves II-XII are grossly intact.  Motor is 5 out of 5 bilaterally.   Sensory is intact.    Neuropsychiatric: Appropriate with examiner      PERTINENT LABS/IMAGING:    I have personally reviewed the following data over the past 24 hrs:    34.9 (H)  \   9.1 (L)   / 111 (L)     N/A N/A N/A /  156 (H)   N/A N/A  N/A \     ALT: 80 (H) AST: 140 (H) AP: 1,946 (H) TBILI: 14.7 (H)   ALB: 2.2 (L) TOT PROTEIN: 4.6 (L) LIPASE: N/A     Procal: 0.69 (H) CRP: N/A Lactic Acid: 2.6 (H)         Imaging results reviewed over the past 24 hrs:   No results found for this or any previous visit (from the past 24 hour(s)).    Discussed with patient, family, nursing staff and discharge planner    Anthony Orosco MD  [facility] Medicine Service  [phone]

## 2024-04-08 NOTE — PLAN OF CARE
Problem: Adult Inpatient Plan of Care  Goal: Optimal Comfort and Wellbeing  Outcome: Progressing     Problem: Adult Inpatient Plan of Care  Goal: Readiness for Transition of Care  Outcome: Progressing   Goal Outcome Evaluation:    Patient AO x4, denies pain or nausea. Bilateral LE swelling, wound dressing posterior left leg clean dry and intact. IV abx given per order. Slept between cares.     Larissa Teixeira RN

## 2024-04-09 NOTE — PROGRESS NOTES
Care Management Discharge Note    Discharge Date: 04/09/2024       Discharge Disposition:  Home    Discharge Services:  Home PT OT HHA    Discharge DME:      Discharge Transportation: family or friend will provide    Handoff Referral Completed: Yes    Additional Information:  Pt discharging home with [facility] PT, OT, HHA. [facility] will plan to see pt after pts appointment to establish new PCP on 4/16.     ELVA PlummerW

## 2024-04-09 NOTE — PLAN OF CARE
Occupational Therapy Discharge Summary    Reason for therapy discharge:    Discharged to home.    Progress towards therapy goal(s). See goals on Care Plan in [facility] electronic health record for goal details.  Goals partially met.  Barriers to achieving goals:   discharge from facility.    Therapy recommendation(s):    Continued therapy is recommended.  Rationale/Recommendations:  Recommend home OT to promote ADL independence.

## 2024-04-09 NOTE — PLAN OF CARE
Problem: VTE (Venous Thromboembolism)  Goal: Tissue Perfusion  Outcome: Progressing     Problem: Oncology Care  Goal: Optimal Pain Control and Function  Outcome: Progressing  Intervention: Develop Pain Management Plan  Recent Flowsheet Documentation  Taken 4/9/2024 0100 by Serg Goldman RN  Pain Management Interventions: declines  Intervention: Optimize Psychosocial Wellbeing  Recent Flowsheet Documentation  Taken 4/9/2024 0100 by Serg Goldman RN  Supportive Measures: active listening utilized   Goal Outcome Evaluation:       Patient complained of back pain rated 1/10, refused to take any pain meds when offered. Has lymph wraps on to bilateral legs. 2 am .

## 2024-04-09 NOTE — PLAN OF CARE
Problem: Adult Inpatient Plan of Care  Goal: Absence of Hospital-Acquired Illness or Injury  Intervention: Prevent Skin Injury  Recent Flowsheet Documentation  Taken 4/8/2024 1535 by Juanis Puga RN  Body Position: weight shifting  Up to chair and walked to the bathroom.   Problem: Adult Inpatient Plan of Care  Goal: Optimal Comfort and Wellbeing  Intervention: Monitor Pain and Promote Comfort  Recent Flowsheet Documentation  Taken 4/8/2024 1535 by Juanis Puga RN  Pain Management Interventions:   declines   emotional support   repositioned   Decline pain med for 1/10 pain.  Problem: Risk for Delirium  Goal: Optimal Coping  Intervention: Optimize Psychosocial Adjustment to Delirium  Recent Flowsheet Documentation  Taken 4/8/2024 1535 by Juanis Puga RN  Supportive Measures:   active listening utilized   verbalization of feelings encouraged     Problem: VTE (Venous Thromboembolism)  Goal: Tissue Perfusion  4/8/2024 2144 by Juanis Puga RN  Outcome: Progressing  4/8/2024 2144 by Juanis Puga RN  Outcome: Progressing  Intervention: Optimize Tissue Perfusion  Recent Flowsheet Documentation  Taken 4/8/2024 1535 by Juanis Puga RN  Bleeding Precautions: monitored for signs of bleeding   Scheduled Lovenox given.  Walked in the room.  Problem: Comorbidity Management  Goal: Blood Glucose Levels Within Targeted Range  Outcome: Progressing  NL=[phone] and 172.

## 2024-04-09 NOTE — PLAN OF CARE
Lymphedema Therapy Discharge Summary    Reason for therapy discharge:    Discharged to home.    Progress towards therapy goal(s). See goals on Care Plan in [facility] electronic health record for goal details.  Goals met    Therapy recommendation(s):    No further therapy is recommended. Discharge instructions included for pt and family.

## 2024-04-09 NOTE — PLAN OF CARE
Physical Therapy Discharge Summary    Reason for therapy discharge:    Discharged to home with home therapy.    Progress towards therapy goal(s). See goals on Care Plan in [facility] electronic health record for goal details.  Goals not met.  Barriers to achieving goals:   discharge from facility.    Therapy recommendation(s):    Continued therapy is recommended.  Rationale/Recommendations:  Continue with home PT as pt is progressing towards goals.

## 2024-04-09 NOTE — DISCHARGE SUMMARY
"[facility]  Hospitalist Discharge Summary      Date of Admission:  4/5/2024  Date of Discharge:  4/9/2024  Discharging Provider: Anthony Orosco MD  Discharge Service: Hospitalist Service    Discharge Diagnoses   Extensive left lower extremity DVT status post thrombectomy by IR.  Hyperlipidemia  Metastatic pancreatic adenocarcinoma  Chronic pain syndrome  Leukocytosis of unknown source  Lactic acidosis, resolved with  Bilateral leg swelling    Clinically Significant Risk Factors     # Overweight: Estimated body mass index is 26.73 kg/m  as calculated from the following:    Height as of this encounter: 1.753 m (5' 9\").    Weight as of this encounter: 82.1 kg (181 lb).       Follow-ups Needed After Discharge   Follow-up Appointments     Follow-up and recommended labs and tests       Follow up with primary care provider, Physician No Ref-Primary, within 7   days for hospital follow- up.  The following labs/tests are recommended:   CBC, LFTs in 2-3 days .    Follow up with oncology and GI as scheduled            Unresulted Labs Ordered in the Past 30 Days of this Admission       No orders found from 3/6/2024 to 4/6/2024.        These results will be followed up by oncology    Discharge Disposition   Discharged to home with home care  Condition at discharge: Stable    Hospital Course   68 year old male with a past medical history of metastatic pancreatic cancer,  DVT, anemia, sepsis, anticoagulated on Lovenox who presents to the ED for evaluation of leg swelling and pain.  Admitted with extensive DVT with plan of thrombectomy.  Please refer to H&P for details     Extensive left lower leg DVT  - History of DVT on 2/2024 and patient has been on Lovenox.  - Lovenox has been on hold for recent biliary stent  - Venous ultrasound showed Extensive left lower extremity deep venous thrombosis extending from the common femoral vein through the visualized proximal profunda femoris, femoral vein, popliteal " vein, and calf veins.   - Start IV heparin  - IR consult, appreciate input   - S/p thrombectomy on 4/6/2024.  - Postprocedure orders as per IR  - Discontinue IV heparin today and transition to subcutaneous Lovenox  -Continue Avelox on discharge     Hyperbilirubinemia   - Secondary to metastatic pancreatic cancer  - S/P biliary stent 1/2/2024  - Bilirubin is increasing after stent placement  - GI consult, appreciate input-   - CT abdomen as per GI shows increasing mass of pancreas and liver mets, stable biliary stent in place  -Continue to monitor LFTs as outpatient     Metastatic pancreatic adenocarcinoma  - S/p palliative chemotherapy  - Follow-up with oncology as outpatient     Chronic pain syndrome  - Secondary to metastatic cancer  - Resume home Dilaudid  - Add IV Dilaudid as needed     Leukocytosis  - Etiology is unclear, steroid versus infection versus cancer  - Patient is immunocompromised with cancer treatment.  - Slight elevated procalcitonin  - Started Zosyn empirically.    - transition to antibiotic, discussed with ID, will continue antibiotic course on discharge given his immunocompromise status  - Continue to monitor CBC as outpatient     Hyponatremia  - Combination of dehydration versus SIADH  - Start IV fluid hydration  - Continue to monitor sodium level.  -Improving, discontinue IV fluid  -Stable sodium level before discharge      Lactic acidosis  -Elevated lactic acid   -No symptoms of infection   - IV fluid and repeat lactic acid level is improved     Bilateral leg swelling  - PT/OT lymphedema  - Continue legs wrapped  -PT/OT lymphedema treatment as outpatient.    Anemia and thrombocytopenia  -Probably secondary to cancer and chemotherapy  -Stable hemoglobin but still on the low side around 9  -No need for blood transfusion at this point  -Continue to monitor as outpatient     Discussed with patient, family, nursing staff and discharge planner.    Consultations This Hospital Stay   PHARMACY IP  CONSULT  GASTROENTEROLOGY IP CONSULT  CARE MANAGEMENT / SOCIAL WORK IP CONSULT  PHYSICAL THERAPY ADULT IP CONSULT  OCCUPATIONAL THERAPY ADULT IP CONSULT  LYMPHEDEMA THERAPY IP CONSULT    Code Status   Full Code    Time Spent on this Encounter   I, Anthony Orosco MD, personally saw the patient today and spent greater than 30 minutes discharging this patient.       Anthony Orosco MD  [address] [address]  Phone: [phone]  Fax: [phone]  ______________________________________________________________________    Physical Exam   Vital Signs: Temp: 97.6  F (36.4  C) Temp src: Oral BP: 102/66 Pulse: 98   Resp: 18 SpO2: 95 % O2 Device: None (Room air)    Weight: 181 lbs 0 oz  Constitutional: awake, alert, cooperative, no apparent distress, and appears stated age  Respiratory: No increased work of breathing, good air exchange, clear to auscultation bilaterally, no crackles or wheezing  Cardiovascular: Normal apical impulse, regular rate and rhythm, normal S1 and S2, no S3 or S4, and no murmur noted  GI: No scars, normal bowel sounds, soft, non-distended, non-tender, no masses palpated, no hepatosplenomegally  Skin: no bruising or bleeding and normal skin color, texture, turgor  Musculoskeletal: Bilateral +2 legs edema.  Neurologic: Awake, alert, oriented to name, place and time.  Cranial nerves II-XII are grossly intact.  Motor is 5 out of 5 bilaterally.   Sensory is intact.    Neuropsychiatric: Appropriate with examiner          Primary Care Physician   Physician No Ref-Primary    Discharge Orders      Home Care Referral      Reason for your hospital stay    Extensive left leg DVT S/P thrombectomy     Follow-up and recommended labs and tests     Follow up with primary care provider, Physician No Ref-Primary, within 7 days for hospital follow- up.  The following labs/tests are recommended: CBC, LFTs in 2-3 days .    Follow up with oncology and GI as  scheduled     Activity    Your activity upon discharge: activity as tolerated     Diet    Follow this diet upon discharge: Orders Placed This Encounter      Regular Diet Adult       Significant Results and Procedures   Most Recent 3 CBC's:  Recent Labs   Lab Test 04/09/24  0613 04/08/24 0622 04/07/24  0706   WBC 32.9* 34.9* 40.0*   HGB 8.5* 9.1* 8.2*   MCV 98 98 98   PLT 97* 111* 109*     Most Recent 3 BMP's:  Recent Labs   Lab Test 04/09/24  0713 04/09/24 0219 04/08/24 2050 04/07/24  0949 04/07/24  0706 04/06/24  0635 04/05/24  1150   NA  --   --   --   --  131* 128* 129*   POTASSIUM  --   --   --   --  4.0 4.4 4.3   CHLORIDE  --   --   --   --  100 96* 97*   CO2  --   --   --   --  22 24 21*   BUN  --   --   --   --  38.2* 33.0* 37.7*   CR  --   --   --   --  0.70 0.58* 0.77   ANIONGAP  --   --   --   --  9 8 11   TREVOR  --   --   --   --  8.8 9.3 9.8   * 139* 172*   < > 246* 185* 179*    < > = values in this interval not displayed.     Most Recent 2 LFT's:  Recent Labs   Lab Test 04/09/24 0613 04/08/24 0622   * 140*   ALT 91* 80*   ALKPHOS 1,779* 1,946*   BILITOTAL 14.9* 14.7*   ,   Results for orders placed or performed during the hospital encounter of 04/05/24   XR Chest Port 1 View    Narrative    EXAM: XR CHEST PORT 1 VIEW  LOCATION: [facility]  DATE: 4/5/2024    INDICATION: Dyspnea  COMPARISON: CT chest, abdomen and pelvis 03/23/2024      Impression    IMPRESSION: Left chest port catheter tip terminates at the superior cavoatrial junction. Shallow inspiration with left greater than right lower lobe atelectasis. Suspected pulmonary metastases are better seen on recent CT. Trace left pleural effusion. No   pneumothorax. Stable heart size and mediastinal contours.   US Lower Extremity Venous Duplex Bilateral     Value    Radiologist flags DVT (AA)    Narrative    EXAM: US LOWER EXTREMITY VENOUS DUPLEX BILATERAL  LOCATION: [facility]  DATE:  4/5/2024    INDICATION: Patient with bilateral lower extremity swelling anasarca, history of left sided DVT with interruption of anticoagulation for EGD  COMPARISON: 02/09/2024  TECHNIQUE: Venous Duplex ultrasound of bilateral lower extremities with and without compression, augmentation and duplex. Color flow and spectral Doppler with waveform analysis performed.    FINDINGS: Exam includes the common femoral, femoral, popliteal veins as well as segmentally visualized deep calf veins and greater saphenous vein.     RIGHT: There is occlusive thrombus within the right posterior tibial and one of the paired peroneal veins. No extension into the popliteal vein or additional DVT. No popliteal cyst.     LEFT: Extensive deep venous thrombosis throughout the left lower extremity deep venous system, including occlusive thrombus in the common femoral vein extending into the visualized profunda femoris as well as into the femoral, popliteal, posterior   tibial, and peroneal veins.  No popliteal cyst.      Impression    IMPRESSION:  1.  Extensive left lower extremity deep venous thrombosis extending from the common femoral vein through the visualized proximal profunda femoris, femoral vein, popliteal vein, and calf veins.  2.  Thrombus within the right posterior tibial and one of the paired peroneal veins in the right calf.      [Critical Result: DVT]    Finding was identified on 4/5/2024 2:43 PM CDT.     Dr. Clayton was contacted by me on 4/5/2024 2:52 PM CDT and verbalized understanding of the critical result.   US Abdomen Limited (RUQ)    Narrative    EXAM: US ABDOMEN LIMITED  LOCATION: [facility]  DATE: 4/5/2024    INDICATION: Increasing bilirubin, recent biliary stent placement  COMPARISON: Abdominal ultrasound 03/28/2024, MRI 03/26/2024  TECHNIQUE: Limited abdominal ultrasound.    FINDINGS:    GALLBLADDER: The gallbladder is contracted. No shadowing gallstones.    BILE DUCTS: No intrahepatic biliary  ductal dilation. The common bile duct measures up to 4 mm. Known biliary stents are not well visualized.     LIVER: The infiltrative centrally necrotic masses within the right and left hepatic lobes are unchanged compared to recent ultrasound and MRI accounting for differences in technique, with the largest component measuring 12.5 x 9.5 x 9.1 cm   sonographically. The main portal vein is patent with hepatopedal flow.    RIGHT KIDNEY: No hydronephrosis.    PANCREAS: The pancreas is largely obscured by overlying gas.    Trace ascites.      Impression    IMPRESSION:  1.  No sonographic findings of intra or extrahepatic biliary ductal dilation. Known biliary stents are not well visualized.  2.  Unchanged large heterogeneous bilobar liver metastases.   IR Lower Extremity Venogram Left    [facility] RADIOLOGY  LOCATION: [facility]  DATE: 4/6/2024    PROCEDURE:   1.  LEFT LOWER EXTREMITY DIAGNOSTIC VENOGRAPHY.  2.  INFERIOR VENACAVOGRAPHY.  3.  MECHANICAL THROMBECTOMY OF THE LEFT COMMON FEMORAL AND SUPERFICIAL FEMORAL VEINS (EXTIRPATION OF MATTER).  4.  ULTRASOUND GUIDANCE FOR VASCULAR ACCESS.  5.  MODERATE SEDATION.    INTERVENTIONAL RADIOLOGIST: Delgado Barth M.D.    INDICATION: 68-year-old male with history of pancreatic cancer and previous DVT with recent onset left lower extremity swelling who was found to have recurrent left lower extremity DVT.    CONSENT: The risks, benefits and alternatives of the stated procedure were discussed with the patient  in detail. All questions were answered. Informed consent was given to proceed with the procedure.    MODERATE SEDATION: None. 50 mcg IV fentanyl ministered for analgesia.    CONTRAST: 30 mL Omni 350  ANTIBIOTICS: None.  ADDITIONAL MEDICATIONS: 3000 units IV heparin, 50 mg IV Benadryl    FLUOROSCOPIC TIME: 4.1 minutes.  RADIATION DOSE: Air Kerma: 165 mGy.    COMPLICATIONS: No immediate complications.    STERILE BARRIER TECHNIQUE: Maximum sterile  barrier technique was used. Cutaneous antisepsis was performed at the operative site with application of 2% chlorhexidine and large sterile drape. Prior to the procedure, the  and assistant performed   hand hygiene and wore hat, mask, sterile gown, and sterile gloves during the entire procedure.    PROCEDURE:  The procedure, including the risks, benefits, and alternatives to the procedure itself were discussed with the patient in the patient's family members. When all of their questions were answered informed written and verbal consent was   obtained. The patient was then brought to the Interventional Radiology suite, placed in the prone position, and both of the patient's popliteal fossa was sterilely prepped and draped. The left popliteal vein was noted to be ultrasound noncompressible.   After giving local anesthesia with lidocaine, the left popliteal vein was punctured with a 21 gauge needle, under ultrasound guidance with a permanent image stored. A 0.018 inch wire advanced through the needle into the superficial femoral vein under   fluoroscopic guidance. The needle was then exchanged over the wire for a 4 [place] coaxial dilator. The inner 3 [place] dilator and 0.018 inch wire were then exchanged for a 0.035 inch guidewire. The outer 4 [place] dilator was then exchanged over the   guidewire for a 14 [place] dilator. Dilatation was performed over the Amplatz wire. Next a 13 [place] Inari ClotTriever sheath was placed over the wire.     A 5 [place] KMP catheter was advanced through the sheath and digital subtraction angiography was performed throughout the left lower extremity in stations. Over a 0.035 inch angled Glidewire the KMP catheter was directed into the inferior vena cava and an   inferior venacavogram was performed. The KMP catheter and Glidewire were advanced into the superior vena cava. The Glidewire was exchanged for a 0.035 inch Amplatz wire.    Next mechanical thrombectomy was performed with  the ClotTriever device (extirpation of matter). After 4 passes repeat digital subtraction venography was obtained. Venography was performed throughout the iliofemoral veins. The visualized left lower   extremity veins remained patent with in-line flow.     The catheter was removed. The sheath was removed with hemostasis achieved via a combination of a purse string suture as well as manual compression.      FINDINGS:  1.  Ultrasound demonstrates an occlusive left popliteal vein. A permanent image was stored.  2.  The initial digital subtraction left lower extremity venography demonstrates DVT within the left superficial femoral and common femoral veins.  3.  The inferior vena cava is widely patent.  4.  Images obtained during the initial mechanical thrombectomy show the Inari device in place.  Repeat venography shows debulking of the acute and chronic thrombotic burden with patency of the left iliofemoral veins.  5.  Following thrombectomy was no evidence of residual/persistent thrombus or stenosis.      Impression    IMPRESSION:    1.  Left lower extremity occlusive deep venous thrombosis involving the left superficial and common femoral veins.  2.  Successful mechanical thrombectomy (extirpation of matter), with a significantly improved angiographic appearance.     PLAN:  It is recommended that the patient remain on anticoagulation as an outpatient for at least 3 months, if possible given the patient's age and other comorbidities.            CT Abdomen Pelvis w/o Contrast    Narrative    EXAM: CT ABDOMEN PELVIS W/O CONTRAST  LOCATION: [facility]  DATE: 4/6/2024    INDICATION: Rising liver function tests with history of metastatic pancreatic adenocarcinoma.  COMPARISON: 03/23/2024.  TECHNIQUE: CT scan of the abdomen and pelvis was performed without IV contrast. Multiplanar reformats were obtained. Dose reduction techniques were used.  CONTRAST: None.    FINDINGS:   LOWER CHEST: Bandlike  basilar opacities, probably atelectasis. Tiny left pleural effusion. Incompletely seen central catheter tip in the low right atrium at the inferior cavoatrial junction. Hypoattenuating cardiac blood pool suggesting anemia.    HEPATOBILIARY: Increasing size of dominant hepatic mass measuring 8.3 cm versus 7.6 cm (series 3, image 48). Smaller liver masses show no significant change. Intrahepatic and common bile duct stenting now present. Biliary dilatation not well evaluated on   this noncontrast exam.    PANCREAS: No new or enlarging findings.    SPLEEN: Normal.    ADRENAL GLANDS: Normal.    KIDNEYS/BLADDER: Contrast in the renal collecting systems and bladder from recent interventional radiology venogram. Otherwise, unremarkable.    BOWEL: Gastric fundus diverticulum. Colonic diverticulosis without diverticulitis. No bowel obstruction.    LYMPH NODES: No new or enlarging adenopathy.    VASCULATURE: Minimal air along the left common femoral vein from recent procedure.    PELVIC ORGANS: Mild ascites.    MUSCULOSKELETAL: Body wall subcutaneous edema.      Impression    IMPRESSION:     1.  Mildly increased size of the largest liver lesion since 03/23/2024. Other liver lesions show no significant change.    2.  Interval intra- and extrahepatic biliary stenting.     3.  Mild ascites. No free air.    4.  Other findings in the report.         Discharge Medications   Current Discharge Medication List        START taking these medications    Details   amoxicillin-clavulanate (AUGMENTIN) 875-125 MG tablet Take 1 tablet by mouth every 12 hours for 7 days  Qty: 14 tablet, Refills: 0    Associated Diagnoses: Metastasis from pancreatic cancer (H); Hyperbilirubinemia      enoxaparin ANTICOAGULANT (LOVENOX) 80 MG/0.8ML syringe Inject 0.8 mLs (80 mg) Subcutaneous every 12 hours for 90 days  Qty: 144 mL, Refills: 0    Associated Diagnoses: Acute deep vein thrombosis (DVT) of femoral vein of both lower extremities (H)            CONTINUE these medications which have NOT CHANGED    Details   Ascorbic Acid 500 MG/5ML LIQD Take 500 mg by mouth every 48 hours      cetirizine (ZYRTEC) 10 MG tablet Take 10 mg by mouth daily      glucosamine 500 MG CAPS capsule Take 500 mg by mouth daily      HYDROmorphone (DILAUDID) 2 MG tablet Take 1-2 tablets (2-4 mg) by mouth every 4 hours as needed for moderate to severe pain  Qty: 60 tablet, Refills: 0    Associated Diagnoses: Overlapping malignant neoplasm of pancreas (H); Cancer associated pain      lipase-protease-amylase (CREON 36) 25315-887512-282434 units CPEP Take 2 capsules by mouth 3 times daily (with meals) for 90 days  Qty: 540 capsule, Refills: 0      Oyster Shell (OYSTER CALCIUM PO) Take 1 tablet by mouth daily Total 1000mg      prochlorperazine (COMPAZINE) 10 MG tablet Take 0.5 tablets (5 mg) by mouth every 6 hours as needed for nausea or vomiting  Qty: 30 tablet, Refills: 2    Associated Diagnoses: Overlapping malignant neoplasm of pancreas (H)      Vitamin D, Cholecalciferol, 25 MCG (1000 UT) CAPS Take 2 capsules by mouth 3 times daily           STOP taking these medications       enoxaparin ANTICOAGULANT (LOVENOX) 120 MG/0.8ML syringe Comments:   Reason for Stopping:         vitamin E (TOCOPHEROL) 400 units (180 mg) capsule Comments:   Reason for Stopping:             Allergies   Allergies   Allergen Reactions    Iodinated Contrast Media Swelling     Developed angioedema and urticaria approximately 12 to 24 hours after undergoing CT with IV contrast.  May also have been related to the use of nonsteroidal anti-inflammatories.  See ER visits 11/1 and 11/2/2022

## 2024-04-09 NOTE — PLAN OF CARE
Goal Outcome Evaluation:         Patient living floor via wheel chair with family. No complications noted

## 2024-04-09 NOTE — TELEPHONE ENCOUNTER
[facility]: Cancer Care                                                                                          Pt to be discharged today from [facility]  Schedule in person clinic follow-up with Barbara Montenegro PA-C Thursday 4/11, with labs CBC, CMP prior. Scheduling messaged.    Signature:  Daysi Johnston RN

## 2024-04-10 NOTE — PROGRESS NOTES
[facility]: [facility]: Post-Discharge Note  SITUATION                                                      Admission:    Admission Date: 04/05/24   Reason for Admission: Extensive left leg DVT  Metastatic adenocarcinoma  Biliary stenting  Increasing hyperbilirubinemia  Increasing alkaline phosphate  Discharge:   Discharge Date: 04/09/24  Discharge Diagnosis: Extensive left lower extremity DVT status post thrombectomy by IR.  Hyperlipidemia  Metastatic pancreatic adenocarcinoma  Chronic pain syndrome  Leukocytosis of unknown source  Lactic acidosis, resolved with  Bilateral leg swelling    BACKGROUND                                                      Per hospital discharge summary and inpatient provider notes:    Obi Lawrence is a 68 year old male with a pertient medical history of metastatic pancreatic cancer,  DVT, anemia, sepsis, anticoagulated on Lovenox who presents to the ED for evaluation of leg swelling.     Patient endorses bilateral lower extremity edema/numbness, generalized weakness, and shortness of breath for the past 3 days. He denies leg pain. Patient denies current diuretic use, or history of heart problems. Patient states he was told to stop his Lovenox until tomorrow.      Per chart review, patient was seen on 4/3/24 at [facility] for palliative care consultation for metastatic pancreatic cancer. Patient with metastatic/stage IV form of pancreatic adenocarcinoma with liver metastasis at the time of diagnosis; as of March 2024, sites of metastasis include liver, lungs, mediastinum. Treatment to date: first-line palliative intent chemotherapy with cycle 1/day 1 gemcitabine with nab-paclitaxel initiated on September 7 2023; progression of disease noted after first two months of this first-line treatment, then patient was enrolled on the following clinical trial:: 11/9/2023 -  Clinical Trial. Enrolled in Study as 2nd-line therapy: [id number]  Molecular Analysis for Combination Therapy Choice ([employer]).       ASSESSMENT           Discharge Assessment  How are you doing now that you are home?: Patient states he is doing fairly well, up and walking a couple times.  How are your symptoms? (Red Flag symptoms escalate to triage hotline per guidelines): Improved  Do you feel your condition is stable enough to be safe at home until your provider visit?: Yes  Does the patient have their discharge instructions? : Yes  Does the patient have questions regarding their discharge instructions? : No  Were you started on any new medications or were there changes to any of your previous medications? : Yes  Does the patient have all of their medications?: Yes  Do you have questions regarding any of your medications? : No  Do you have all of your needed medical supplies or equipment (DME)?  (i.e. oxygen tank, CPAP, cane, etc.): Yes  Discharge follow-up appointment scheduled within 14 calendar days? : Yes  Discharge Follow Up Appointment Date: 04/16/24  Discharge Follow Up Appointment Scheduled with?: Primary Care Provider         Post-op (Clinicians Only)  Did the patient have surgery or a procedure: Yes  Incision: closed  Drainage: No  Bleeding: none  Fever: No  Chills: No  Redness: No  Warmth: No  Swelling: Yes (Swelling is improving.)  Incision site pain: No  Eating & Drinking: eating and drinking without complaints/concerns  PO Intake: regular diet  Additional Symptoms: decreased appetite (About the same as when he was in the hospital.)  Bowel Function: loose stools  Date of last BM: 04/10/24  Urinary Status: voiding without complaint/concerns      PLAN                                                      Outpatient Plan:   Follow up with primary care provider, Physician No Ref-Primary, within 7   days for hospital follow- up.  The following labs/tests are recommended:   CBC, LFTs in 2-3 days .    Follow up with oncology and GI as scheduled        Future Appointments    Date Time Provider Department Center   4/11/2024  9:00 AM  [facility]   4/11/2024  9:30 AM Barbara Montenegro PA-C [facility] [place]   4/11/2024 10:00 AM Rachel Graves, HERIBERTO [facility]   4/16/2024 11:00 AM [facility]   4/23/2024 12:45 PM  [facility]   4/23/2024  1:15 PM Barbara Montenegro PA-C [facility] [place]   4/23/2024  2:30 PM  ONC INFUSION NURSE [facility] [place]   4/25/2024  7:30 AM UC ONC INFUSION NURSE [facility] [place]   5/7/2024  8:15 AM  [facility]   5/7/2024  9:00 AM  ONC INFUSION NURSE [facility] [place]   5/21/2024  2:00 PM  [facility]   5/21/2024  2:30 PM UC ONC INFUSION NURSE [facility] [place]   5/29/2024  8:30 AM [facility] LAB DRAW 2 [facility]   5/29/2024  9:40 AM [facility] CT 2 JNCTSC [facility]   6/4/2024  8:30 AM [facility]   6/4/2024  9:00 AM UC ONC INFUSION NURSE [facility] [place]   6/7/2024  8:30 AM Jimmy Bravo MD [facility] [place]         For any urgent concerns, please contact our 24 hour nurse triage line: [phone] ([phone])         Larissa Hahn RN
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
## 2024-04-11 NOTE — NURSING NOTE
Per Barbara Montenegro PA-C, blood cultures drawn x2, UA collected and pt sent for Chest XR in stable condition. Port flushed with heparin and deaccessed.    Emily Meese, RN

## 2024-04-11 NOTE — NURSING NOTE
Chief Complaint   Patient presents with    Blood Draw     Port blood draw by lab RN     Port flushed with heparin and deaccessed, vitals taken and appointment arrived    Hilary Penn RN

## 2024-04-11 NOTE — NURSING NOTE
"Oncology Rooming Note    April 11, 2024 9:31 AM   Obi Lawrence is a 68 year old male who presents for:    Chief Complaint   Patient presents with    Blood Draw     Port blood draw by lab RN    Oncology Clinic Visit     Overlapping malignant neoplasm of pancreas     Initial Vitals: /73   Pulse 110   Temp 97.4  F (36.3  C) (Oral)   Resp 16   Wt 86.9 kg (191 lb 9.6 oz)   BMI 28.29 kg/m   Estimated body mass index is 28.29 kg/m  as calculated from the following:    Height as of 4/5/24: 1.753 m (5' 9\").    Weight as of this encounter: 86.9 kg (191 lb 9.6 oz). Body surface area is 2.06 meters squared.  No Pain (0) Comment: Data Unavailable   No LMP for male patient.  Allergies reviewed: Yes  Medications reviewed: Yes    Medications: Medication refills not needed today.  Pharmacy name entered into [employer]:    [facility] [place] [id number]  [facility] - [place], [place] - [address]    Frailty Screening:   Is the patient here for a new oncology consult visit in cancer care? 2. No      Clinical concerns: none      Iris Bender              "
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
## 2024-04-12 NOTE — PROGRESS NOTES
[facility]: Cancer Care                                                                                          Called patient per care team to inform CXR showed worsening atelectasis versus early pneumonia.  Patient is already on Augmentin but provider added Doxycycline in the off chance this is pneumonia.  Informed patient atelectasis is more likely but he has an elevated WBC so Doxycycline will help to cover bases.    Virginia Salomon RN  Cancer Care Coordinator  [facility]

## 2024-04-17 NOTE — PROGRESS NOTES
Called Pt to confirm, Per Barbara GUAJARDO, that Pt had enrolled in hospice last week and future appts can be cancelled.  Pt confirms this.

## (undated) DEVICE — ENDO SNARE POLYPECTOMY OVAL 15MM LOOP SD-240U-15

## (undated) DEVICE — ORGANIZER MIO MEDICAL DEVICE 00711750

## (undated) DEVICE — ENDO TUBING CO2 SMARTCAP STERILE DISP 100145CO2EXT

## (undated) DEVICE — GUIDEWIRE NOVAGOLD .018X260CM STR TIP M00552000

## (undated) DEVICE — KIT CONNECTOR FOR OLYMPUS ENDOSCOPES DEFENDO 100310

## (undated) DEVICE — SUCTION MANIFOLD NEPTUNE 2 SYS 4 PORT 0702-020-000

## (undated) DEVICE — SPHINCTEROTOME 5.5FRX25MM OMNI-TOME FS-OMNI-35 G31911

## (undated) DEVICE — ENDO CATH BALLOON DILATION HURRICANE 04MMX4X180CM M00545900

## (undated) DEVICE — BIOPSY VALVE BIOSHIELD 00711135

## (undated) DEVICE — SOL WATER IRRIG 1000ML BOTTLE 2F7114

## (undated) DEVICE — ESU GROUND PAD ADULT W/CORD E7507

## (undated) DEVICE — ENDO BITE BLOCK ADULT OMNI-BLOC

## (undated) DEVICE — STENT JOHLIN PANCREA WEDGE 08.5FRX22CM WINTRO G26832: Type: IMPLANTABLE DEVICE | Site: BILE DUCT | Status: NON-FUNCTIONAL

## (undated) DEVICE — INFLATION DEVICE BIG 60 ENDO-AN6012

## (undated) DEVICE — CATH RETRIEVAL BALLOON EXTRACTOR PRO RX-S INJ ABOVE 9-12MM

## (undated) DEVICE — GLIDEWIRE TERUMO .035X180CM STR GR503

## (undated) DEVICE — KIT ENDO FIRST STEP DISINFECTANT 200ML W/POUCH EP-4

## (undated) DEVICE — WIRE GUIDE 0.025"X270CM ANG VISIGLIDE G-240-2527A

## (undated) DEVICE — PACK ENDOSCOPY GI CUSTOM UMMC

## (undated) DEVICE — ENDO FUSION OMNI-TOME 21 FS-OMNI-21 G48675

## (undated) DEVICE — ENDO ENDO DISTAL MAJ-2315

## (undated) RX ORDER — EPHEDRINE SULFATE 50 MG/ML
INJECTION, SOLUTION INTRAMUSCULAR; INTRAVENOUS; SUBCUTANEOUS
Status: DISPENSED
Start: 2024-01-01

## (undated) RX ORDER — HEPARIN SODIUM 1000 [USP'U]/ML
INJECTION, SOLUTION INTRAVENOUS; SUBCUTANEOUS
Status: DISPENSED
Start: 2024-01-01

## (undated) RX ORDER — FENTANYL CITRATE-0.9 % NACL/PF 10 MCG/ML
PLASTIC BAG, INJECTION (ML) INTRAVENOUS
Status: DISPENSED
Start: 2024-01-01

## (undated) RX ORDER — HEPARIN SODIUM (PORCINE) LOCK FLUSH IV SOLN 100 UNIT/ML 100 UNIT/ML
SOLUTION INTRAVENOUS
Status: DISPENSED
Start: 2024-01-01

## (undated) RX ORDER — CEFAZOLIN SODIUM/WATER 2 G/20 ML
SYRINGE (ML) INTRAVENOUS
Status: DISPENSED
Start: 2024-01-01

## (undated) RX ORDER — FENTANYL CITRATE 50 UG/ML
INJECTION, SOLUTION INTRAMUSCULAR; INTRAVENOUS
Status: DISPENSED
Start: 2024-01-01

## (undated) RX ORDER — ONDANSETRON 2 MG/ML
INJECTION INTRAMUSCULAR; INTRAVENOUS
Status: DISPENSED
Start: 2024-01-01

## (undated) RX ORDER — LEVOFLOXACIN 5 MG/ML
INJECTION, SOLUTION INTRAVENOUS
Status: DISPENSED
Start: 2024-01-01

## (undated) RX ORDER — PROPOFOL 10 MG/ML
INJECTION, EMULSION INTRAVENOUS
Status: DISPENSED
Start: 2024-01-01

## (undated) RX ORDER — DEXAMETHASONE SODIUM PHOSPHATE 4 MG/ML
INJECTION, SOLUTION INTRA-ARTICULAR; INTRALESIONAL; INTRAMUSCULAR; INTRAVENOUS; SOFT TISSUE
Status: DISPENSED
Start: 2024-01-01

## (undated) RX ORDER — ACETAMINOPHEN 325 MG/1
TABLET ORAL
Status: DISPENSED
Start: 2024-01-01

## (undated) RX ORDER — LIDOCAINE HYDROCHLORIDE 10 MG/ML
INJECTION, SOLUTION EPIDURAL; INFILTRATION; INTRACAUDAL; PERINEURAL
Status: DISPENSED
Start: 2024-01-01

## (undated) RX ORDER — DIPHENHYDRAMINE HYDROCHLORIDE 50 MG/ML
INJECTION INTRAMUSCULAR; INTRAVENOUS
Status: DISPENSED
Start: 2024-01-01